# Patient Record
Sex: MALE | URBAN - METROPOLITAN AREA
[De-identification: names, ages, dates, MRNs, and addresses within clinical notes are randomized per-mention and may not be internally consistent; named-entity substitution may affect disease eponyms.]

---

## 2021-10-21 ENCOUNTER — TRANSFERRED RECORDS (OUTPATIENT)
Dept: HEALTH INFORMATION MANAGEMENT | Facility: CLINIC | Age: 41
End: 2021-10-21

## 2021-10-21 LAB
CREATININE (EXTERNAL): 0.9 MG/DL (ref 0.72–1.25)
GFR ESTIMATED (EXTERNAL): >60 ML/MIN/1.73M2
GFR ESTIMATED (IF AFRICAN AMERICAN) (EXTERNAL): >60 ML/MIN/1.73M2
PHQ9 SCORE: 6

## 2021-12-07 ENCOUNTER — TRANSCRIBE ORDERS (OUTPATIENT)
Dept: OTHER | Age: 41
End: 2021-12-07

## 2021-12-07 DIAGNOSIS — R78.71 ABNORMAL LEAD LEVEL IN BLOOD: Primary | ICD-10-CM

## 2021-12-08 ENCOUNTER — PATIENT OUTREACH (OUTPATIENT)
Dept: ONCOLOGY | Facility: CLINIC | Age: 41
End: 2021-12-08
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
## 2022-08-02 ENCOUNTER — PATIENT OUTREACH (OUTPATIENT)
Dept: ONCOLOGY | Facility: CLINIC | Age: 42
End: 2022-08-02

## 2022-08-02 DIAGNOSIS — G83.4: Primary | ICD-10-CM
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
## 2022-08-03 ENCOUNTER — TELEPHONE (OUTPATIENT)
Dept: NEUROLOGY | Facility: CLINIC | Age: 42
End: 2022-08-03

## 2022-08-03 NOTE — TELEPHONE ENCOUNTER
[facility] Call Center    Phone Message    May a detailed message be left on voicemail: yes     Reason for Call: Appointment Intake    Referring Provider Name: Oncology  Diagnosis and/or Symptoms: Cauda equina syndrome not affecting bladder     Patient has a referral, diagnosis is not in our protocols, please review and contact patient to schedule as appropriate    Action Taken: Other: [facility] neuro    Travel Screening: Not Applicable

## 2022-08-10 NOTE — TELEPHONE ENCOUNTER
Hello,     Please review. Per protocol diagnosis Cauda equina syndrome is seen in neurosurgery.     Thanks    Yari QUIGLEY

## 2022-08-10 NOTE — TELEPHONE ENCOUNTER
[facility] Call Center    Phone Message    May a detailed message be left on voicemail: yes     Reason for Call: Other: Pt calling in to schedule, dx not on protocols. TE sent a week ago for assistance, please call pt back for scheduling as call center unable to scheudle without some direction as dx is not listed on protocols.     Action Taken: Message routed to:  [facility] neurology    Travel Screening: Not Applicable

## 2022-08-11 NOTE — TELEPHONE ENCOUNTER
[facility] Call Center    Phone Message    May a detailed message be left on voicemail: yes     Reason for Call: Other: Patient was calling on the status of setting up an appointment since he has not heard back yet.      Action Taken: Other: Neurosurgery    Travel Screening: Not Applicable

## 2024-11-21 ENCOUNTER — LAB REQUISITION (OUTPATIENT)
Dept: LAB | Facility: CLINIC | Age: 44
End: 2024-11-21

## 2024-11-21 PROCEDURE — 88341 IMHCHEM/IMCYTCHM EA ADD ANTB: CPT | Mod: TC | Performed by: STUDENT IN AN ORGANIZED HEALTH CARE EDUCATION/TRAINING PROGRAM

## 2024-11-26 LAB
PATH REPORT.COMMENTS IMP SPEC: ABNORMAL
PATH REPORT.COMMENTS IMP SPEC: YES
PATH REPORT.FINAL DX SPEC: ABNORMAL
PATH REPORT.GROSS SPEC: ABNORMAL
PATH REPORT.MICROSCOPIC SPEC OTHER STN: ABNORMAL
PATH REPORT.RELEVANT HX SPEC: ABNORMAL
PATH REPORT.RELEVANT HX SPEC: ABNORMAL
PATH REPORT.SITE OF ORIGIN SPEC: ABNORMAL

## 2024-11-27 PROCEDURE — 81287 MGMT GENE PRMTR MTHYLTN ALYS: CPT | Performed by: PATHOLOGY

## 2024-11-27 PROCEDURE — 81455 SO/HL 51/>GSAP DNA/DNA&RNA: CPT | Performed by: PATHOLOGY

## 2024-12-03 ENCOUNTER — PATIENT OUTREACH (OUTPATIENT)
Dept: ONCOLOGY | Facility: CLINIC | Age: 44
End: 2024-12-03
Payer: COMMERCIAL

## 2024-12-03 ENCOUNTER — TRANSCRIBE ORDERS (OUTPATIENT)
Dept: OTHER | Age: 44
End: 2024-12-03

## 2024-12-03 DIAGNOSIS — C71.9 GLIOMA OF BRAIN (H): Primary | ICD-10-CM
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
## 2024-12-05 NOTE — TELEPHONE ENCOUNTER
RECORDS STATUS - ALL OTHER DIAGNOSIS      RECORDS RECEIVED FROM: [facility]   DATE RECEIVED:    NOTES STATUS DETAILS   OFFICE NOTE from referring provider [facility] Dr. Kota Aguilar:    OPERATIVE REPORT [facility] 11/20/24: iotomy   MEDICATION LIST [facility]   LABS     PATHOLOGY REPORTS [facility] Path Consult  11/21/24: CASE FROM [place], [place] ([id number], OBTAINED 11/20/2024)   ANYTHING RELATED TO DIAGNOSIS Epic/ 11/22/24   GENONOMIC TESTING     TYPE: Epic 11/26/24   IMAGING (NEED IMAGES & REPORT)     CT SCANS Requested 12/5 [facility]  11/20/24, 11/19/24, 11/18/24    [facility]  11/18/24

## 2024-12-09 ENCOUNTER — PRE VISIT (OUTPATIENT)
Dept: ONCOLOGY | Facility: CLINIC | Age: 44
End: 2024-12-09
Payer: COMMERCIAL

## 2024-12-09 ENCOUNTER — TUMOR CONFERENCE (OUTPATIENT)
Dept: ONCOLOGY | Facility: CLINIC | Age: 44
End: 2024-12-09
Payer: COMMERCIAL

## 2024-12-09 ENCOUNTER — ONCOLOGY VISIT (OUTPATIENT)
Dept: ONCOLOGY | Facility: CLINIC | Age: 44
End: 2024-12-09
Attending: PSYCHIATRY & NEUROLOGY
Payer: COMMERCIAL

## 2024-12-09 VITALS
HEART RATE: 88 BPM | BODY MASS INDEX: 24.84 KG/M2 | DIASTOLIC BLOOD PRESSURE: 81 MMHG | WEIGHT: 187.4 LBS | SYSTOLIC BLOOD PRESSURE: 134 MMHG | TEMPERATURE: 98.7 F | HEIGHT: 73 IN

## 2024-12-09 DIAGNOSIS — G93.6 BRAIN SWELLING (H): ICD-10-CM
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
## 2024-12-09 DIAGNOSIS — C71.9 HIGH GRADE GLIOMA NOT CLASSIFIABLE BY WHO CRITERIA (H): Primary | ICD-10-CM

## 2024-12-09 DIAGNOSIS — Z18.10 EMBEDDED METAL FRAGMENTS: ICD-10-CM

## 2024-12-09 DIAGNOSIS — Z91.89 HIGH RISK FOR CHEMOTHERAPY-INDUCED INFECTIOUS COMPLICATION: ICD-10-CM

## 2024-12-09 PROCEDURE — 99205 OFFICE O/P NEW HI 60 MIN: CPT | Performed by: PSYCHIATRY & NEUROLOGY

## 2024-12-09 PROCEDURE — G2211 COMPLEX E/M VISIT ADD ON: HCPCS | Performed by: PSYCHIATRY & NEUROLOGY

## 2024-12-09 PROCEDURE — 99417 PROLNG OP E/M EACH 15 MIN: CPT | Performed by: PSYCHIATRY & NEUROLOGY

## 2024-12-09 PROCEDURE — 99213 OFFICE O/P EST LOW 20 MIN: CPT | Performed by: PSYCHIATRY & NEUROLOGY

## 2024-12-09 RX ORDER — DEXAMETHASONE 4 MG/1
2 TABLET ORAL 2 TIMES DAILY
COMMUNITY
Start: 2024-11-21

## 2024-12-09 RX ORDER — CHLORAL HYDRATE 500 MG
1000 CAPSULE ORAL DAILY
COMMUNITY
Start: 2024-02-15

## 2024-12-09 RX ORDER — AMPICILLIN TRIHYDRATE 500 MG
1000 CAPSULE ORAL DAILY
COMMUNITY
Start: 2024-05-10

## 2024-12-09 RX ORDER — DIPHENHYDRAMINE HYDROCHLORIDE 50 MG/ML
50 INJECTION INTRAMUSCULAR; INTRAVENOUS
Start: 2024-12-30

## 2024-12-09 RX ORDER — EPINEPHRINE 1 MG/ML
0.3 INJECTION, SOLUTION INTRAMUSCULAR; SUBCUTANEOUS EVERY 5 MIN PRN
OUTPATIENT
Start: 2024-12-30

## 2024-12-09 RX ORDER — MEPERIDINE HYDROCHLORIDE 25 MG/ML
25 INJECTION INTRAMUSCULAR; INTRAVENOUS; SUBCUTANEOUS
OUTPATIENT
Start: 2024-12-30

## 2024-12-09 RX ORDER — ESCITALOPRAM OXALATE 5 MG/1
5 TABLET ORAL DAILY
COMMUNITY
Start: 2024-11-12

## 2024-12-09 RX ORDER — ALBUTEROL SULFATE 90 UG/1
1-2 INHALANT RESPIRATORY (INHALATION)
Start: 2024-12-30

## 2024-12-09 RX ORDER — IBUPROFEN 800 MG/1
1 TABLET, FILM COATED ORAL 3 TIMES DAILY PRN
COMMUNITY
Start: 2024-05-10

## 2024-12-09 RX ORDER — DIPHENHYDRAMINE HYDROCHLORIDE 50 MG/ML
25 INJECTION INTRAMUSCULAR; INTRAVENOUS
Start: 2024-12-30

## 2024-12-09 RX ORDER — DEXTROAMPHETAMINE SACCHARATE, AMPHETAMINE ASPARTATE, DEXTROAMPHETAMINE SULFATE AND AMPHETAMINE SULFATE 5; 5; 5; 5 MG/1; MG/1; MG/1; MG/1
20 TABLET ORAL PRN
COMMUNITY
Start: 2024-10-31

## 2024-12-09 RX ORDER — PENTAMIDINE ISETHIONATE 300 MG/300MG
300 INHALANT RESPIRATORY (INHALATION) ONCE
Start: 2024-12-30 | End: 2024-12-30

## 2024-12-09 RX ORDER — ALBUTEROL SULFATE 0.83 MG/ML
2.5 SOLUTION RESPIRATORY (INHALATION) ONCE
Start: 2024-12-30 | End: 2024-12-30

## 2024-12-09 RX ORDER — ZOLPIDEM TARTRATE 5 MG/1
1 TABLET ORAL AT BEDTIME
COMMUNITY
Start: 2024-11-29

## 2024-12-09 RX ORDER — PREGABALIN 75 MG/1
75 CAPSULE ORAL 2 TIMES DAILY
COMMUNITY
Start: 2024-11-12

## 2024-12-09 RX ORDER — ALBUTEROL SULFATE 0.83 MG/ML
2.5 SOLUTION RESPIRATORY (INHALATION)
OUTPATIENT
Start: 2024-12-30

## 2024-12-09 RX ORDER — ACETAMINOPHEN 325 MG/1
2 TABLET ORAL EVERY 4 HOURS PRN
COMMUNITY
Start: 2024-11-21

## 2024-12-09 RX ORDER — TRAMADOL HYDROCHLORIDE 50 MG/1
50 TABLET ORAL 3 TIMES DAILY
COMMUNITY
Start: 2024-10-14

## 2024-12-09 RX ORDER — METHYLPREDNISOLONE SODIUM SUCCINATE 40 MG/ML
40 INJECTION INTRAMUSCULAR; INTRAVENOUS
Start: 2024-12-30

## 2024-12-09 ASSESSMENT — PAIN SCALES - GENERAL: PAINLEVEL_OUTOF10: NO PAIN (0)
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
## 2024-12-09 NOTE — NURSING NOTE
"Oncology Rooming Note    December 9, 2024 9:12 AM   Jasson Mohamud is a 44 year old male who presents for:    Chief Complaint   Patient presents with    Oncology Clinic Visit     Glioma of brain     Initial Vitals: /81 (BP Location: Right arm, Patient Position: Sitting, Cuff Size: Adult Regular)   Pulse 88   Temp 98.7  F (37.1  C) (Oral)   Ht 1.846 m (6' 0.68\")   Wt 85 kg (187 lb 6.4 oz)   BMI 24.94 kg/m   Estimated body mass index is 24.94 kg/m  as calculated from the following:    Height as of this encounter: 1.846 m (6' 0.68\").    Weight as of this encounter: 85 kg (187 lb 6.4 oz). Body surface area is 2.09 meters squared.  No Pain (0) Comment: Data Unavailable   No LMP for male patient.  Allergies reviewed: Yes  Medications reviewed: Yes    Medications: Medication refills not needed today.  Pharmacy name entered into [facility]: [facility] [address] [place], [place] - [address] [place] [address]    Frailty Screening:   Is the patient here for a new oncology consult visit in cancer care? 1. Yes. Over the past month, have you experienced difficulty or required a caregiver to assist with:   1. Balance, walking or general mobility (including any falls)? NO  2. Completion of self-care tasks such as bathing, dressing, toileting, grooming/hygiene?  NO  3. Concentration or memory that affects your daily life?  NO       Clinical concerns: Patient states no new concerns to discuss with provider.        Adelfo Mesa, EMT            "

## 2024-12-10 RX ORDER — DEXAMETHASONE 2 MG/1
TABLET ORAL
Qty: 14 TABLET | Refills: 0 | Status: SHIPPED | OUTPATIENT
Start: 2024-12-10

## 2024-12-10 RX ORDER — DEXAMETHASONE 1 MG
TABLET ORAL
Qty: 14 TABLET | Refills: 0 | Status: SHIPPED | OUTPATIENT
Start: 2024-12-10

## 2024-12-11 ENCOUNTER — ANCILLARY PROCEDURE (OUTPATIENT)
Dept: GENERAL RADIOLOGY | Facility: CLINIC | Age: 44
End: 2024-12-11
Attending: PSYCHIATRY & NEUROLOGY
Payer: COMMERCIAL
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
## 2024-12-11 DIAGNOSIS — Z18.10 EMBEDDED METAL FRAGMENTS: ICD-10-CM

## 2024-12-11 PROCEDURE — 72100 X-RAY EXAM L-S SPINE 2/3 VWS: CPT | Mod: TC | Performed by: RADIOLOGY

## 2024-12-16 ENCOUNTER — TUMOR CONFERENCE (OUTPATIENT)
Dept: ONCOLOGY | Facility: CLINIC | Age: 44
End: 2024-12-16
Payer: COMMERCIAL

## 2024-12-17 ENCOUNTER — PATIENT OUTREACH (OUTPATIENT)
Dept: ONCOLOGY | Facility: CLINIC | Age: 44
End: 2024-12-17
Payer: COMMERCIAL

## 2024-12-17 ENCOUNTER — TELEPHONE (OUTPATIENT)
Dept: ONCOLOGY | Facility: CLINIC | Age: 44
End: 2024-12-17
Payer: COMMERCIAL

## 2024-12-17 DIAGNOSIS — C71.9 GLIOBLASTOMA (H): Primary | ICD-10-CM

## 2024-12-17 NOTE — TELEPHONE ENCOUNTER
Called and spoke with Jasson and I discussed the following;  -I reviewed the final pathology results confirming the diagnosis of glioblastoma.    Therefore, the plan is for chemoradiotherapy with concurrent, then adjuvant temozolomide.    -I reviewed the spine x-ray results and my discussion with neuro-radiology. Based on the results, it was determined that Jasson can have a MRI of the brain if it is performed on a 1.5T scanner with close intra-imaging monitoring for new onset pain/ burning sensation in the back.       Plan:   Imaging order placed; needing MR brain perfusion imaging scheduled at [facility] on the 1.5T scanner as soon as possible.     2.   RNCC to reach out to [facility]; provide update about final diagnosis and the scan that is to be performed so it can be used for their radiation planning.      3.    Awaiting pharmacy to complete insurance prior authorization of temozolomide. Finalized treatment plan is for chemoradiotherapy with concurrent temozolomide.     Daysi Shaffer MD  Neuro-oncology  12/17/2024

## 2024-12-17 NOTE — PROGRESS NOTES
"[facility]: Cancer Care                                                                                      Writer called patient to follow up regarding whether he has radiation scheduled.  Patient reports he has a \"mask fitting\" appointment with [facility] in [place] tomorrow.      Patient questions whether he would be able to get a lumbar MRI based on the results of the x-ray.  He would like to further discuss with Dr. Shaffer.    He also mentioned waiting on pathology results to move forward.    Will route to Dr. Shaffer for guidance.    Myrna Farmer, RN, BSN, OCN  Oncology RN Care Coordinator  [facility]  "

## 2024-12-18 ENCOUNTER — PATIENT OUTREACH (OUTPATIENT)
Dept: ONCOLOGY | Facility: CLINIC | Age: 44
End: 2024-12-18
Payer: COMMERCIAL

## 2024-12-18 ENCOUNTER — TELEPHONE (OUTPATIENT)
Dept: ONCOLOGY | Facility: CLINIC | Age: 44
End: 2024-12-18
Payer: COMMERCIAL

## 2024-12-18 ENCOUNTER — TRANSFERRED RECORDS (OUTPATIENT)
Dept: HEALTH INFORMATION MANAGEMENT | Facility: CLINIC | Age: 44
End: 2024-12-18
Payer: COMMERCIAL

## 2024-12-18 DIAGNOSIS — Z79.899 ENCOUNTER FOR LONG-TERM (CURRENT) USE OF MEDICATIONS: ICD-10-CM

## 2024-12-18 DIAGNOSIS — C71.9 HIGH GRADE GLIOMA NOT CLASSIFIABLE BY WHO CRITERIA (H): Primary | ICD-10-CM

## 2024-12-18 RX ORDER — TEMOZOLOMIDE 140 MG/1
140 CAPSULE ORAL AT BEDTIME
Qty: 42 CAPSULE | Refills: 0 | Status: SHIPPED | OUTPATIENT
Start: 2024-12-30 | End: 2025-02-10

## 2024-12-18 RX ORDER — ONDANSETRON 4 MG/1
4 TABLET, FILM COATED ORAL AT BEDTIME
Qty: 30 TABLET | Refills: 3 | Status: SHIPPED | OUTPATIENT
Start: 2024-12-29

## 2024-12-18 RX ORDER — TEMOZOLOMIDE 20 MG/1
20 CAPSULE ORAL AT BEDTIME
Qty: 42 CAPSULE | Refills: 0 | Status: SHIPPED | OUTPATIENT
Start: 2024-12-30 | End: 2025-02-10

## 2024-12-18 NOTE — TELEPHONE ENCOUNTER
"I called Jasson to touch base re: temozolomide order. We are releasing #42 doses of temozolomide to [facility] pharmacy today. Pt has an MRI this Monday and will determine next steps for radiation. I'm having scheduling reach out to schedule baseline labs. Pt is aware he'll need weekly labs. I also reinforced NOT to start temozolomide until the night before radiation starts.  Pt [facility] [place].   Pt was instructed to stop tumeric due to DDI. Pt mentioned he's also taking \"lions concetta mushroom\", no DDI detected per uptodate database.    We'll keep an eye out for baseline labs and for when radiation will start. We'll follow up with Jasson a week after radiation starts and also monitor his weekly labs.    Jasson is very kind and a pleasure to talk with.    Roselyn Damon PharmD  Hematology/Oncology Clinical Pharmacist  [facility]  [phone]    **The oral chemotherapy pharmacists are now working on provider-specific teams. Your pharmacist team can be reached at [facility]-ONC2 or [phone].**    "

## 2024-12-18 NOTE — PROGRESS NOTES
[facility]: Cancer Care                                                                                      Writer updated [facility] in [place] that patient will be getting a brain MRI on Monday for planning purposes.     Myrna Farmer, RN, BSN, OCN  Oncology RN Care Coordinator  [facility]

## 2024-12-19 NOTE — TUMOR CONFERENCE
Tumor Conference Information     Chemoradiation.       Documentation / Disclaimer Cancer Tumor Board Note  Cancer tumor board recommendations do not override what is determined to be reasonable care and treatment, which is dependent on the circumstances of a patient's case; the patient's medical, social, and personal concerns; and the clinical judgment of the oncologist [physician].

## 2024-12-23 ENCOUNTER — LAB (OUTPATIENT)
Dept: LAB | Facility: CLINIC | Age: 44
End: 2024-12-23
Payer: COMMERCIAL

## 2024-12-23 ENCOUNTER — HOSPITAL ENCOUNTER (OUTPATIENT)
Dept: MRI IMAGING | Facility: CLINIC | Age: 44
Discharge: HOME | End: 2024-12-23
Attending: PSYCHIATRY & NEUROLOGY | Admitting: PSYCHIATRY & NEUROLOGY
Payer: COMMERCIAL

## 2024-12-23 DIAGNOSIS — Z79.899 ENCOUNTER FOR LONG-TERM (CURRENT) USE OF MEDICATIONS: ICD-10-CM

## 2024-12-23 DIAGNOSIS — C71.9 GLIOBLASTOMA (H): ICD-10-CM

## 2024-12-23 DIAGNOSIS — C71.9 HIGH GRADE GLIOMA NOT CLASSIFIABLE BY WHO CRITERIA (H): ICD-10-CM

## 2024-12-23 LAB
ALBUMIN SERPL BCG-MCNC: 4.3 G/DL (ref 3.5–5.2)
ALP SERPL-CCNC: 89 U/L (ref 40–150)
ALT SERPL W P-5'-P-CCNC: 35 U/L (ref 0–70)
ANION GAP SERPL CALCULATED.3IONS-SCNC: 9 MMOL/L (ref 7–15)
AST SERPL W P-5'-P-CCNC: 21 U/L (ref 0–45)
BASOPHILS # BLD AUTO: 0 10E3/UL (ref 0–0.2)
BASOPHILS NFR BLD AUTO: 0 %
BILIRUB SERPL-MCNC: 0.4 MG/DL
BUN SERPL-MCNC: 15 MG/DL (ref 6–20)
CALCIUM SERPL-MCNC: 9.5 MG/DL (ref 8.8–10.4)
CHLORIDE SERPL-SCNC: 104 MMOL/L (ref 98–107)
CREAT SERPL-MCNC: 0.71 MG/DL (ref 0.67–1.17)
EGFRCR SERPLBLD CKD-EPI 2021: >90 ML/MIN/1.73M2
EOSINOPHIL # BLD AUTO: 0 10E3/UL (ref 0–0.7)
EOSINOPHIL NFR BLD AUTO: 1 %
ERYTHROCYTE [DISTWIDTH] IN BLOOD BY AUTOMATED COUNT: 12.9 % (ref 10–15)
GLUCOSE SERPL-MCNC: 109 MG/DL (ref 70–99)
HCO3 SERPL-SCNC: 28 MMOL/L (ref 22–29)
HCT VFR BLD AUTO: 40.2 % (ref 40–53)
HGB BLD-MCNC: 13.2 G/DL (ref 13.3–17.7)
IMM GRANULOCYTES # BLD: 0 10E3/UL
IMM GRANULOCYTES NFR BLD: 1 %
LYMPHOCYTES # BLD AUTO: 0.8 10E3/UL (ref 0.8–5.3)
LYMPHOCYTES NFR BLD AUTO: 14 %
MCH RBC QN AUTO: 29.6 PG (ref 26.5–33)
MCHC RBC AUTO-ENTMCNC: 32.8 G/DL (ref 31.5–36.5)
MCV RBC AUTO: 90 FL (ref 78–100)
MONOCYTES # BLD AUTO: 0.4 10E3/UL (ref 0–1.3)
MONOCYTES NFR BLD AUTO: 6 %
NEUTROPHILS # BLD AUTO: 4.9 10E3/UL (ref 1.6–8.3)
NEUTROPHILS NFR BLD AUTO: 79 %
NRBC # BLD AUTO: 0 10E3/UL
NRBC BLD AUTO-RTO: 0 /100
PLATELET # BLD AUTO: 220 10E3/UL (ref 150–450)
POTASSIUM SERPL-SCNC: 4.7 MMOL/L (ref 3.4–5.3)
PROT SERPL-MCNC: 7.1 G/DL (ref 6.4–8.3)
RBC # BLD AUTO: 4.46 10E6/UL (ref 4.4–5.9)
SODIUM SERPL-SCNC: 141 MMOL/L (ref 135–145)
WBC # BLD AUTO: 6.1 10E3/UL (ref 4–11)

## 2024-12-23 PROCEDURE — 70551 MRI BRAIN STEM W/O DYE: CPT | Mod: 26 | Performed by: RADIOLOGY

## 2024-12-23 PROCEDURE — 85025 COMPLETE CBC W/AUTO DIFF WBC: CPT | Performed by: PATHOLOGY

## 2024-12-23 PROCEDURE — 80053 COMPREHEN METABOLIC PANEL: CPT | Performed by: PATHOLOGY

## 2024-12-23 PROCEDURE — 36415 COLL VENOUS BLD VENIPUNCTURE: CPT | Performed by: PATHOLOGY

## 2024-12-23 PROCEDURE — 70551 MRI BRAIN STEM W/O DYE: CPT

## 2024-12-24 NOTE — ORAL ONC MGMT
Oral Chemotherapy Monitoring Program  Lab Follow Up    Reviewed CBC and CMP lab results from 12/23/2024.        12/9/2024    11:00 AM 12/9/2024    11:58 AM 12/18/2024    11:00 AM 12/24/2024    10:00 AM   ORAL CHEMOTHERAPY   Assessment Type Initial Work up New Teach Other Lab Monitoring   Diagnosis Code Brain Cancer - Glioblastoma Brain Cancer - Glioblastoma Brain Cancer - Glioblastoma Brain Cancer - Glioblastoma   Providers Dr. Edmund Shaffer   [facility]   Is this patient followed by the [facility] OC team?   No No   Drug Name Temodar (temozolomide) Temodar (temozolomide) Temodar (temozolomide) Temodar (temozolomide)   Dose 160 mg 160 mg 160 mg 160 mg   Current Schedule Daily Daily Daily Daily   Cycle Details Continuous for 42 days during XRT Continuous for 42 days during XRT Continuous for 42 days during XRT Continuous for 42 days during XRT   Any new drug interactions? Yes Yes  Yes   Pharmacist Intervention? Yes Yes  Yes   Intervention(s) Patient Education;Drug changed (non-chemo) Patient Education  Patient Education   Is the dose as ordered appropriate for the patient? Yes Yes  Yes       Labs:  _  Result Component Current Result Ref Range   Sodium 141 (12/23/2024) 135 - 145 mmol/L     _  Result Component Current Result Ref Range   Potassium 4.7 (12/23/2024) 3.4 - 5.3 mmol/L     _  Result Component Current Result Ref Range   Calcium 9.5 (12/23/2024) 8.8 - 10.4 mg/dL     No results found for Mag within last 30 days.     No results found for Phos within last 30 days.     _  Result Component Current Result Ref Range   Albumin 4.3 (12/23/2024) 3.5 - 5.2 g/dL     _  Result Component Current Result Ref Range   Urea Nitrogen 15.0 (12/23/2024) 6.0 - 20.0 mg/dL     _  Result Component Current Result Ref Range   Creatinine 0.71 (12/23/2024) 0.67 - 1.17 mg/dL     _  Result Component Current Result Ref Range   AST 21 (12/23/2024) 0 - 45 U/L     _  Result Component Current  Result Ref Range   ALT 35 (12/23/2024) 0 - 70 U/L     _  Result Component Current Result Ref Range   Bilirubin Total 0.4 (12/23/2024) <=1.2 mg/dL     _  Result Component Current Result Ref Range   WBC Count 6.1 (12/23/2024) 4.0 - 11.0 10e3/uL     _  Result Component Current Result Ref Range   Hemoglobin 13.2 (L) (12/23/2024) 13.3 - 17.7 g/dL     _  Result Component Current Result Ref Range   Platelet Count 220 (12/23/2024) 150 - 450 10e3/uL   _  Result Component Current Result Ref Range   Absolute Neutrophils 4.9 (12/23/2024) 1.6 - 8.3 10e3/uL        Assessment & Plan:  Reviewed baseline labs. No new concerning lab abnormalities. No changes with temozolomide needed at this time.    Follow-Up:  1. Review start date of TMZ and XRT with patient when determined  2. Review dose of temozolomide 160 mg (140mg + 20mg), confirmed after teach    3. Review DDI with tumeric, confirmed after teach, temozolomide + tumeric: per [employer] interaction- Moderate Be cautious with this combination. Turmeric has antioxidant effects. Theoretically, this may reduce the activity of chemotherapy drugs that generate free radicals. However, research is conflicting.  4. Review duration of radiation, pending start date patient may need additional temozolomide due to holiday     Alfred Calderon, PharmD  Hematology/Oncology Clinical Pharmacist   [facility]   [phone]

## 2024-12-26 DIAGNOSIS — C71.9 HIGH GRADE GLIOMA NOT CLASSIFIABLE BY WHO CRITERIA (H): Primary | ICD-10-CM

## 2024-12-27 ENCOUNTER — HOSPITAL ENCOUNTER (OUTPATIENT)
Dept: CT IMAGING | Facility: CLINIC | Age: 44
Discharge: HOME | End: 2024-12-27
Attending: RADIOLOGY | Admitting: RADIOLOGY
Payer: COMMERCIAL

## 2024-12-27 DIAGNOSIS — C71.9 HIGH GRADE GLIOMA NOT CLASSIFIABLE BY WHO CRITERIA (H): ICD-10-CM

## 2024-12-27 PROCEDURE — 250N000009 HC RX 250: Performed by: RADIOLOGY

## 2024-12-27 PROCEDURE — 250N000011 HC RX IP 250 OP 636: Performed by: RADIOLOGY

## 2024-12-27 PROCEDURE — 70470 CT HEAD/BRAIN W/O & W/DYE: CPT

## 2024-12-27 RX ORDER — IOPAMIDOL 755 MG/ML
500 INJECTION, SOLUTION INTRAVASCULAR ONCE
Status: COMPLETED | OUTPATIENT
Start: 2024-12-27 | End: 2024-12-27

## 2024-12-27 RX ADMIN — SODIUM CHLORIDE 60 ML: 9 INJECTION, SOLUTION INTRAVENOUS at 14:33

## 2024-12-27 RX ADMIN — IOPAMIDOL 67 ML: 755 INJECTION, SOLUTION INTRAVENOUS at 14:33

## 2024-12-28 ENCOUNTER — HEALTH MAINTENANCE LETTER (OUTPATIENT)
Age: 44
End: 2024-12-28

## 2024-12-30 ENCOUNTER — TELEPHONE (OUTPATIENT)
Dept: ONCOLOGY | Facility: CLINIC | Age: 44
End: 2024-12-30

## 2024-12-30 ENCOUNTER — LAB (OUTPATIENT)
Dept: LAB | Facility: CLINIC | Age: 44
End: 2024-12-30
Attending: PSYCHIATRY & NEUROLOGY
Payer: COMMERCIAL

## 2024-12-30 ENCOUNTER — TRANSFERRED RECORDS (OUTPATIENT)
Dept: HEALTH INFORMATION MANAGEMENT | Facility: CLINIC | Age: 44
End: 2024-12-30

## 2024-12-30 DIAGNOSIS — C71.9 HIGH GRADE GLIOMA NOT CLASSIFIABLE BY WHO CRITERIA (H): Primary | ICD-10-CM

## 2024-12-30 DIAGNOSIS — C71.9 HIGH GRADE GLIOMA NOT CLASSIFIABLE BY WHO CRITERIA (H): ICD-10-CM

## 2024-12-30 LAB
ALBUMIN SERPL BCG-MCNC: 4.3 G/DL (ref 3.5–5.2)
ALP SERPL-CCNC: 85 U/L (ref 40–150)
ALT SERPL W P-5'-P-CCNC: 47 U/L (ref 0–70)
ANION GAP SERPL CALCULATED.3IONS-SCNC: 9 MMOL/L (ref 7–15)
AST SERPL W P-5'-P-CCNC: 28 U/L (ref 0–45)
BASOPHILS # BLD AUTO: 0 10E3/UL (ref 0–0.2)
BASOPHILS NFR BLD AUTO: 0 %
BILIRUB SERPL-MCNC: 0.4 MG/DL
BUN SERPL-MCNC: 13.6 MG/DL (ref 6–20)
CALCIUM SERPL-MCNC: 10 MG/DL (ref 8.8–10.4)
CHLORIDE SERPL-SCNC: 104 MMOL/L (ref 98–107)
CREAT SERPL-MCNC: 0.76 MG/DL (ref 0.67–1.17)
EGFRCR SERPLBLD CKD-EPI 2021: >90 ML/MIN/1.73M2
EOSINOPHIL # BLD AUTO: 0 10E3/UL (ref 0–0.7)
EOSINOPHIL NFR BLD AUTO: 0 %
ERYTHROCYTE [DISTWIDTH] IN BLOOD BY AUTOMATED COUNT: 12.7 % (ref 10–15)
GLUCOSE SERPL-MCNC: 105 MG/DL (ref 70–99)
HBV CORE AB SERPL QL IA: NONREACTIVE
HBV SURFACE AG SERPL QL IA: NONREACTIVE
HCO3 SERPL-SCNC: 27 MMOL/L (ref 22–29)
HCT VFR BLD AUTO: 39.4 % (ref 40–53)
HGB BLD-MCNC: 13.3 G/DL (ref 13.3–17.7)
IMM GRANULOCYTES # BLD: 0 10E3/UL
IMM GRANULOCYTES NFR BLD: 0 %
LYMPHOCYTES # BLD AUTO: 0.8 10E3/UL (ref 0.8–5.3)
LYMPHOCYTES NFR BLD AUTO: 10 %
MCH RBC QN AUTO: 29.8 PG (ref 26.5–33)
MCHC RBC AUTO-ENTMCNC: 33.8 G/DL (ref 31.5–36.5)
MCV RBC AUTO: 88 FL (ref 78–100)
MONOCYTES # BLD AUTO: 0.4 10E3/UL (ref 0–1.3)
MONOCYTES NFR BLD AUTO: 6 %
NEUTROPHILS # BLD AUTO: 6.3 10E3/UL (ref 1.6–8.3)
NEUTROPHILS NFR BLD AUTO: 83 %
NRBC # BLD AUTO: 0 10E3/UL
NRBC BLD AUTO-RTO: 0 /100
PLATELET # BLD AUTO: 294 10E3/UL (ref 150–450)
POTASSIUM SERPL-SCNC: 4.8 MMOL/L (ref 3.4–5.3)
PROT SERPL-MCNC: 7.2 G/DL (ref 6.4–8.3)
RBC # BLD AUTO: 4.47 10E6/UL (ref 4.4–5.9)
SODIUM SERPL-SCNC: 140 MMOL/L (ref 135–145)
WBC # BLD AUTO: 7.6 10E3/UL (ref 4–11)

## 2024-12-30 PROCEDURE — 86704 HEP B CORE ANTIBODY TOTAL: CPT

## 2024-12-30 PROCEDURE — 87340 HEPATITIS B SURFACE AG IA: CPT

## 2024-12-30 PROCEDURE — 82435 ASSAY OF BLOOD CHLORIDE: CPT

## 2024-12-30 PROCEDURE — 85025 COMPLETE CBC W/AUTO DIFF WBC: CPT

## 2024-12-30 PROCEDURE — 94642 AEROSOL INHALATION TREATMENT: CPT | Performed by: INTERNAL MEDICINE

## 2024-12-30 PROCEDURE — 94640 AIRWAY INHALATION TREATMENT: CPT | Performed by: INTERNAL MEDICINE

## 2024-12-30 PROCEDURE — 36415 COLL VENOUS BLD VENIPUNCTURE: CPT

## 2024-12-30 RX ORDER — EPINEPHRINE 1 MG/ML
0.3 INJECTION, SOLUTION, CONCENTRATE INTRAVENOUS EVERY 5 MIN PRN
OUTPATIENT
Start: 2025-01-27

## 2024-12-30 RX ORDER — METHYLPREDNISOLONE SODIUM SUCCINATE 40 MG/ML
40 INJECTION INTRAMUSCULAR; INTRAVENOUS
Start: 2025-01-27

## 2024-12-30 RX ORDER — ALBUTEROL SULFATE 0.83 MG/ML
2.5 SOLUTION RESPIRATORY (INHALATION) ONCE
Start: 2025-01-27 | End: 2025-01-27

## 2024-12-30 RX ORDER — ALBUTEROL SULFATE 0.83 MG/ML
2.5 SOLUTION RESPIRATORY (INHALATION) ONCE
Status: COMPLETED | OUTPATIENT
Start: 2024-12-30 | End: 2024-12-30

## 2024-12-30 RX ORDER — DIPHENHYDRAMINE HYDROCHLORIDE 50 MG/ML
25 INJECTION INTRAMUSCULAR; INTRAVENOUS
Start: 2025-01-27

## 2024-12-30 RX ORDER — DIPHENHYDRAMINE HYDROCHLORIDE 50 MG/ML
50 INJECTION INTRAMUSCULAR; INTRAVENOUS
Start: 2025-01-27

## 2024-12-30 RX ORDER — PENTAMIDINE ISETHIONATE 300 MG/300MG
300 INHALANT RESPIRATORY (INHALATION) ONCE
Start: 2025-01-27 | End: 2025-01-27

## 2024-12-30 RX ORDER — MEPERIDINE HYDROCHLORIDE 25 MG/ML
25 INJECTION INTRAMUSCULAR; INTRAVENOUS; SUBCUTANEOUS
OUTPATIENT
Start: 2025-01-27

## 2024-12-30 RX ORDER — ALBUTEROL SULFATE 0.83 MG/ML
2.5 SOLUTION RESPIRATORY (INHALATION)
OUTPATIENT
Start: 2025-01-27

## 2024-12-30 RX ORDER — ALBUTEROL SULFATE 90 UG/1
1-2 INHALANT RESPIRATORY (INHALATION)
Start: 2025-01-27

## 2024-12-30 RX ORDER — PENTAMIDINE ISETHIONATE 300 MG/300MG
300 INHALANT RESPIRATORY (INHALATION) ONCE
Status: COMPLETED | OUTPATIENT
Start: 2024-12-30 | End: 2024-12-30

## 2024-12-30 RX ADMIN — ALBUTEROL SULFATE 2.5 MG: 0.83 SOLUTION RESPIRATORY (INHALATION) at 13:48

## 2024-12-30 RX ADMIN — PENTAMIDINE ISETHIONATE 300 MG: 300 INHALANT RESPIRATORY (INHALATION) at 13:58

## 2024-12-30 NOTE — ORAL ONC MGMT
Oral Chemotherapy Monitoring Program     Jasson Mohamud called in follow up of temozolomide oral chemotherapy.     I called Jasson today to confirm radiation start. Jasson reported he had his first radiation today and he did start Temodar last night. We reviewed Temodar dosing and administration.     Jasson is aware of weekly labs and I have send an inbasket to assist with scheduling this today. Also as he will not have radiation on New Years day, he will need an extra dose of Temodar. Order was added to treatment plan today and routed to Dr Shaffer.     Pt verbalized understanding and agrees to plan. No further questions or concerns at this time, but encouraged pt to call if any were to arise.    India Baez, PharmD  Oral Chemotherapy Monitoring Program  [facility]  [phone]  December 30, 2024

## 2024-12-30 NOTE — NURSING NOTE
Chief Complaint   Patient presents with    Labs Only     Labs drawn via  by RN.      Mary Renteria, RN

## 2024-12-30 NOTE — PROGRESS NOTES
Jasson Mohamud was seen today for a Pentamidine nebulizer tx ordered by Dr. Daysi Shaffer.    Patient was first given 2.5 mg of Albuterol nebulizer (LOT# [id number], EXP 5/31/2026), after which Pentamidine 300 mg (Lot # [id number], EXP 08/2026) mixed with 6cc Sterile H20 was administered through a filtered nebulizer.    Pre-treatment: SpO2 = 95%   HR = 70   BBS = clear   Post-treatment: SpO2 = 99%  HR = 76  BBS = clear    No adverse side effects noted by the patient.    This service today was provided under Dr. Becerra, who was available if needed.     Procedure was completed by Sidra Dixon.

## 2025-01-02 ENCOUNTER — TELEPHONE (OUTPATIENT)
Dept: ONCOLOGY | Facility: CLINIC | Age: 45
End: 2025-01-02
Payer: COMMERCIAL

## 2025-01-02 NOTE — ORAL ONC MGMT
Oral Chemotherapy Monitoring Program    Subjective/Objective:  Jasson Mohamud is a 44 year old male contacted by phone for a follow-up of reported rash.  Jasson shares that the rash started yesterday.  It is on his chest, arms, back, and scalp.  It is itchy with blotchyy red spots.  He said he did take an epsom salt bath yesterday, but he has taken these before.  I asked him if the epsom salt was any different than normal as some can contain lavender and other additives.  He did not pay attention to see if it was a different type than what his wife usually buys.  He did take Zytec 10mg this morning and this is helping a little. He is not uncomfortable at this time.  He states he did not sleep well last night as he was itching all night, especially his scalp.  He is also having worsening nerve pain that is also keeping him up.  He is on Ambien for sleep, which he takes as needed.  He is unsure if this is really working, as he is up about 4 hours after he takes it.  I recommended he take Benadryl tonight and skip the Ambien to help him sleep through the itching and  famotidine and take 20mg twice daily.  He will do this. Let him know someone will reach out to him today to get scheduled to see an [facility] tomorrow to evaluate the rash.  He should go to the ER if he is feeling shortness of breath or rash gets significantly worse.           12/9/2024    11:00 AM 12/9/2024    11:58 AM 12/18/2024    11:00 AM 12/24/2024    10:00 AM 12/30/2024     3:00 PM 1/2/2025    10:00 AM   ORAL CHEMOTHERAPY   Assessment Type Initial Work up New Teach Other Lab Monitoring;Chart Review Other Other   Diagnosis Code Brain Cancer - Glioblastoma Brain Cancer - Glioblastoma Brain Cancer - Glioblastoma Brain Cancer - Glioblastoma Brain Cancer - Glioblastoma Brain Cancer - Glioblastoma   Providers Dr. Edmund Shaffer   [facility]   Is this patient  "followed by the [facility] OC team?   No No No No   Drug Name Temodar (temozolomide) Temodar (temozolomide) Temodar (temozolomide) Temodar (temozolomide) Temodar (temozolomide) Temodar (temozolomide)   Dose 160 mg 160 mg 160 mg 160 mg 160 mg 160 mg   Current Schedule Daily Daily Daily Daily Daily Daily   Cycle Details Continuous for 42 days during XRT Continuous for 42 days during XRT Continuous for 42 days during XRT Continuous for 42 days during XRT Continuous for 42 days during XRT Continuous for 42 days during XRT   Start Date of Last Cycle     12/29/2024 1/29/2025   Adverse Effects      Rash   Rash      Grade 2   Pharmacist Intervention(Rash)      Yes   Intervention(s)      OTC recommendation   Any new drug interactions? Yes Yes  Yes     Pharmacist Intervention? Yes Yes  Yes     Intervention(s) Patient Education;Drug changed (non-chemo) Patient Education  Patient Education     Is the dose as ordered appropriate for the patient? Yes Yes  Yes         Last PHQ-2 Score on record:        No data to display                Vitals:  BP:   BP Readings from Last 1 Encounters:   12/09/24 134/81     Wt Readings from Last 1 Encounters:   12/09/24 85 kg (187 lb 6.4 oz)     Estimated body surface area is 2.09 meters squared as calculated from the following:    Height as of 12/9/24: 1.846 m (6' 0.68\").    Weight as of 12/9/24: 85 kg (187 lb 6.4 oz).    Labs:  _  Result Component Current Result Ref Range   Sodium 140 (12/30/2024) 135 - 145 mmol/L     _  Result Component Current Result Ref Range   Potassium 4.8 (12/30/2024) 3.4 - 5.3 mmol/L     _  Result Component Current Result Ref Range   Calcium 10.0 (12/30/2024) 8.8 - 10.4 mg/dL     No results found for Mag within last 30 days.     No results found for Phos within last 30 days.     _  Result Component Current Result Ref Range   Albumin 4.3 (12/30/2024) 3.5 - 5.2 g/dL     _  Result Component Current Result Ref Range   Urea Nitrogen 13.6 (12/30/2024) 6.0 - 20.0 mg/dL     _  Result " Component Current Result Ref Range   Creatinine 0.76 (12/30/2024) 0.67 - 1.17 mg/dL     _  Result Component Current Result Ref Range   AST 28 (12/30/2024) 0 - 45 U/L     _  Result Component Current Result Ref Range   ALT 47 (12/30/2024) 0 - 70 U/L     _  Result Component Current Result Ref Range   Bilirubin Total 0.4 (12/30/2024) <=1.2 mg/dL     _  Result Component Current Result Ref Range   WBC Count 7.6 (12/30/2024) 4.0 - 11.0 10e3/uL     _  Result Component Current Result Ref Range   Hemoglobin 13.3 (12/30/2024) 13.3 - 17.7 g/dL     _  Result Component Current Result Ref Range   Platelet Count 294 (12/30/2024) 150 - 450 10e3/uL     No results found for ANC within last 30 days.     _  Result Component Current Result Ref Range   Absolute Neutrophils 6.3 (12/30/2024) 1.6 - 8.3 10e3/uL          Assessment/Plan:  Start famotidine 20mg bid along with Zyrtec 10mg.  Take benadryl tonight at bedtime per directions on package.  Take temozolomide tonight.  Wait for call from [facility] to make appointment for tomorrow to have rash evaluated.      Follow-Up:  1/3: [facility] visit to evaluate rash      Caprice Zhou, PharmD  Hematology/Oncology Clinical Pharmacist  [facility]  [phone]

## 2025-01-03 ENCOUNTER — VIRTUAL VISIT (OUTPATIENT)
Dept: ONCOLOGY | Facility: CLINIC | Age: 45
End: 2025-01-03
Attending: NURSE PRACTITIONER
Payer: COMMERCIAL

## 2025-01-03 VITALS — HEIGHT: 73 IN | WEIGHT: 187 LBS | BODY MASS INDEX: 24.78 KG/M2

## 2025-01-03 DIAGNOSIS — G93.6 BRAIN SWELLING (H): ICD-10-CM

## 2025-01-03 PROCEDURE — 98006 SYNCH AUDIO-VIDEO EST MOD 30: CPT | Performed by: NURSE PRACTITIONER

## 2025-01-03 RX ORDER — DEXAMETHASONE 1 MG
TABLET ORAL
Qty: 14 TABLET | Refills: 0 | Status: CANCELLED | OUTPATIENT
Start: 2025-01-03

## 2025-01-03 ASSESSMENT — PAIN SCALES - GENERAL: PAINLEVEL_OUTOF10: MILD PAIN (2)

## 2025-01-03 NOTE — NURSING NOTE
Current patient location: [address] [place] [id number]    Is the patient currently in the state of [place]? YES    Visit mode:VIDEO    If the visit is dropped, the patient can be reconnected by:VIDEO VISIT: Text to cell phone:   Telephone Information:   Mobile [phone]       Will anyone else be joining the visit? NO  (If patient encounters technical issues they should call [phone]    Are changes needed to the allergy or medication list? No    Are refills needed on medications prescribed by this physician? YES dexAMETHasone (DECADRON) 1 MG tablet     Rooming Documentation:  Questionnaire(s) completed    Reason for visit: RECHECK    Sachin Navarro VVF
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
## 2025-01-03 NOTE — PROGRESS NOTES
Virtual Visit Details    Type of service:  Video Visit     Originating Location (pt. Location): Home  Distant Location (provider location):  Off-site  Platform used for Video Visit: [place] Teddy Bear    Time start: 1055  Time stop: 1115

## 2025-01-06 ENCOUNTER — LAB (OUTPATIENT)
Dept: LAB | Facility: CLINIC | Age: 45
End: 2025-01-06
Payer: COMMERCIAL

## 2025-01-06 DIAGNOSIS — C71.9 HIGH GRADE GLIOMA NOT CLASSIFIABLE BY WHO CRITERIA (H): ICD-10-CM

## 2025-01-06 DIAGNOSIS — Z79.899 ENCOUNTER FOR LONG-TERM (CURRENT) USE OF MEDICATIONS: ICD-10-CM

## 2025-01-06 LAB
ALBUMIN SERPL BCG-MCNC: 4.3 G/DL (ref 3.5–5.2)
ALP SERPL-CCNC: 79 U/L (ref 40–150)
ALT SERPL W P-5'-P-CCNC: 48 U/L (ref 0–70)
ANION GAP SERPL CALCULATED.3IONS-SCNC: 11 MMOL/L (ref 7–15)
AST SERPL W P-5'-P-CCNC: 26 U/L (ref 0–45)
BASOPHILS # BLD AUTO: 0 10E3/UL (ref 0–0.2)
BASOPHILS NFR BLD AUTO: 1 %
BILIRUB SERPL-MCNC: 0.2 MG/DL
BUN SERPL-MCNC: 11.8 MG/DL (ref 6–20)
CALCIUM SERPL-MCNC: 9.5 MG/DL (ref 8.8–10.4)
CHLORIDE SERPL-SCNC: 107 MMOL/L (ref 98–107)
CREAT SERPL-MCNC: 0.83 MG/DL (ref 0.67–1.17)
EGFRCR SERPLBLD CKD-EPI 2021: >90 ML/MIN/1.73M2
EOSINOPHIL # BLD AUTO: 0.1 10E3/UL (ref 0–0.7)
EOSINOPHIL NFR BLD AUTO: 2 %
ERYTHROCYTE [DISTWIDTH] IN BLOOD BY AUTOMATED COUNT: 12.8 % (ref 10–15)
GLUCOSE SERPL-MCNC: 99 MG/DL (ref 70–99)
HCO3 SERPL-SCNC: 26 MMOL/L (ref 22–29)
HCT VFR BLD AUTO: 38.9 % (ref 40–53)
HGB BLD-MCNC: 12.6 G/DL (ref 13.3–17.7)
IMM GRANULOCYTES # BLD: 0 10E3/UL
IMM GRANULOCYTES NFR BLD: 0 %
LYMPHOCYTES # BLD AUTO: 0.9 10E3/UL (ref 0.8–5.3)
LYMPHOCYTES NFR BLD AUTO: 18 %
MCH RBC QN AUTO: 29.4 PG (ref 26.5–33)
MCHC RBC AUTO-ENTMCNC: 32.4 G/DL (ref 31.5–36.5)
MCV RBC AUTO: 91 FL (ref 78–100)
MONOCYTES # BLD AUTO: 0.5 10E3/UL (ref 0–1.3)
MONOCYTES NFR BLD AUTO: 10 %
NEUTROPHILS # BLD AUTO: 3.7 10E3/UL (ref 1.6–8.3)
NEUTROPHILS NFR BLD AUTO: 70 %
NRBC # BLD AUTO: 0 10E3/UL
NRBC BLD AUTO-RTO: 0 /100
PLATELET # BLD AUTO: 250 10E3/UL (ref 150–450)
POTASSIUM SERPL-SCNC: 3.7 MMOL/L (ref 3.4–5.3)
PROT SERPL-MCNC: 6.7 G/DL (ref 6.4–8.3)
RBC # BLD AUTO: 4.28 10E6/UL (ref 4.4–5.9)
SODIUM SERPL-SCNC: 144 MMOL/L (ref 135–145)
WBC # BLD AUTO: 5.3 10E3/UL (ref 4–11)

## 2025-01-06 PROCEDURE — 85014 HEMATOCRIT: CPT

## 2025-01-06 PROCEDURE — 85004 AUTOMATED DIFF WBC COUNT: CPT

## 2025-01-06 PROCEDURE — 84155 ASSAY OF PROTEIN SERUM: CPT

## 2025-01-06 PROCEDURE — 82040 ASSAY OF SERUM ALBUMIN: CPT

## 2025-01-06 PROCEDURE — 36415 COLL VENOUS BLD VENIPUNCTURE: CPT

## 2025-01-06 PROCEDURE — 82435 ASSAY OF BLOOD CHLORIDE: CPT

## 2025-01-07 ENCOUNTER — TELEPHONE (OUTPATIENT)
Dept: ONCOLOGY | Facility: CLINIC | Age: 45
End: 2025-01-07
Payer: COMMERCIAL

## 2025-01-07 DIAGNOSIS — C71.9 HIGH GRADE GLIOMA NOT CLASSIFIABLE BY WHO CRITERIA (H): Primary | ICD-10-CM

## 2025-01-07 NOTE — ORAL ONC MGMT
Oral Chemotherapy Monitoring Program  Lab Follow Up    Reviewed CBC lab results from 1/6/25.        12/9/2024    11:00 AM 12/9/2024    11:58 AM 12/18/2024    11:00 AM 12/24/2024    10:00 AM 12/30/2024     3:00 PM 1/2/2025    10:00 AM 1/7/2025     2:00 PM   ORAL CHEMOTHERAPY   Assessment Type Initial Work up New Teach Other Lab Monitoring;Chart Review Other Other Initial Follow up;Lab Monitoring   Diagnosis Code Brain Cancer - Glioblastoma Brain Cancer - Glioblastoma Brain Cancer - Glioblastoma Brain Cancer - Glioblastoma Brain Cancer - Glioblastoma Brain Cancer - Glioblastoma Brain Cancer - Glioblastoma   Providers Dr. Edmund Shaffer   [facility]   Is this patient followed by the [facility] OC team?   No No No No    Drug Name Temodar (temozolomide) Temodar (temozolomide) Temodar (temozolomide) Temodar (temozolomide) Temodar (temozolomide) Temodar (temozolomide) Temodar (temozolomide)   Dose 160 mg 160 mg 160 mg 160 mg 160 mg 160 mg 160 mg   Current Schedule Daily Daily Daily Daily Daily Daily Daily   Cycle Details Continuous for 42 days during XRT Continuous for 42 days during XRT Continuous for 42 days during XRT Continuous for 42 days during XRT Continuous for 42 days during XRT Continuous for 42 days during XRT Continuous for 42 days during XRT   Start Date of Last Cycle     12/29/2024 1/29/2025 12/29/2024   Adverse Effects      Rash Rash   Rash      Grade 2 Grade 1   Pharmacist Intervention(Rash)      Yes Yes   Intervention(s)      OTC recommendation Patient education   Any new drug interactions? Yes Yes  Yes   No   Pharmacist Intervention? Yes Yes  Yes      Intervention(s) Patient Education;Drug changed (non-chemo) Patient Education  Patient Education      Is the dose as ordered appropriate for the patient? Yes Yes  Yes   Yes   Since the last time we talked, have you been hospitalized or used the emergency  room?       No       Labs:  _  Result Component Current Result Ref Range   Sodium 144 (1/6/2025) 135 - 145 mmol/L     _  Result Component Current Result Ref Range   Potassium 3.7 (1/6/2025) 3.4 - 5.3 mmol/L     _  Result Component Current Result Ref Range   Calcium 9.5 (1/6/2025) 8.8 - 10.4 mg/dL     _  Result Component Current Result Ref Range   Albumin 4.3 (1/6/2025) 3.5 - 5.2 g/dL     _  Result Component Current Result Ref Range   Urea Nitrogen 11.8 (1/6/2025) 6.0 - 20.0 mg/dL     _  Result Component Current Result Ref Range   Creatinine 0.83 (1/6/2025) 0.67 - 1.17 mg/dL     _  Result Component Current Result Ref Range   AST 26 (1/6/2025) 0 - 45 U/L     _  Result Component Current Result Ref Range   ALT 48 (1/6/2025) 0 - 70 U/L     _  Result Component Current Result Ref Range   Bilirubin Total 0.2 (1/6/2025) <=1.2 mg/dL     _  Result Component Current Result Ref Range   WBC Count 5.3 (1/6/2025) 4.0 - 11.0 10e3/uL     _  Result Component Current Result Ref Range   Hemoglobin 12.6 (L) (1/6/2025) 13.3 - 17.7 g/dL     _  Result Component Current Result Ref Range   Platelet Count 250 (1/6/2025) 150 - 450 10e3/uL     Result Component Current Result Ref Range   Absolute Neutrophils 3.7 (1/6/2025) 1.6 - 8.3 10e3/uL        Assessment & Plan:  No new concerning lab abnormalities. No changes with temozolomide needed at this time.    Placed call to patient in follow up of labs from 1/6. Spoke to Jasson. Reviewed no new concerning lab abnormalities. No questions from labs and will continue weekly CBC. Jasson confirms taking the appropriate dose of temozolomide 160mg (140mg + 20mg)  once daily at bedtime and started 12/29. Plans for temozolomide 12/29/24 to 2/9/25 for 43 days (extra day due to 12/31 holiday) and radiation 12/30 to 2/10/25. Discussed refill for 1 day supply of temozolomide will be released to [facility]. He has not skipped or missed any doses of temozolomide.     Denies new or worsening side  effects, missed doses, and recent hospital or ED visits. Jasson reports tramadol and gabapentin dose were increased by primary care provider, patient has been dealing with pain management since 1997. Denies any other medication changes or starting new medications.     Jasson reports the rash has continued to resolve over the weekend. He said rash occurred the morning after taking a hot bath with epsom salt, woke up with bright red and itchy rash. By the second day it started to resolve and over the weekend it was light pink. He stopped taking Zertec over the weekend. Jasson continues to take Benadryl at night. He will call clinic if rash recurs.     Follow-Up:  1/13: visit and qweek CBC labs     Alfred Calderon, PharmD  Hematology/Oncology Clinical Pharmacist   [facility]   [phone]

## 2025-01-08 ENCOUNTER — TELEPHONE (OUTPATIENT)
Dept: ONCOLOGY | Facility: CLINIC | Age: 45
End: 2025-01-08
Payer: COMMERCIAL

## 2025-01-08 RX ORDER — TEMOZOLOMIDE 140 MG/1
140 CAPSULE ORAL AT BEDTIME
Qty: 1 CAPSULE | Refills: 0 | Status: SHIPPED | OUTPATIENT
Start: 2025-01-08 | End: 2025-01-09

## 2025-01-08 RX ORDER — TEMOZOLOMIDE 20 MG/1
20 CAPSULE ORAL AT BEDTIME
Qty: 1 CAPSULE | Refills: 0 | Status: SHIPPED | OUTPATIENT
Start: 2025-01-08 | End: 2025-01-09

## 2025-01-08 NOTE — TELEPHONE ENCOUNTER
PA Initiation    Medication: TEMOZOLOMIDE 140 MG PO CAPS  Insurance Company: [employer] - Phone [phone] Fax [phone]  Pharmacy Filling the Rx:    Filling Pharmacy Phone:    Filling Pharmacy Fax:    Start Date: 1/8/2025        Mary Gonzalez CPhTOncology Pharmacy [facility] [address] [address]  Office: [phone]  Fax: [phone]  [email or web address]

## 2025-01-08 NOTE — TELEPHONE ENCOUNTER
PA Initiation    Medication: TEMOZOLOMIDE 20 MG PO CAPS  Insurance Company: [employer] - Phone [phone] Fax [phone]  Pharmacy Filling the Rx:    Filling Pharmacy Phone:    Filling Pharmacy Fax:    Start Date: 1/8/2025        Mary Gonzalez CPhTOncology Pharmacy [facility] [address] [address]  Office: [phone]  Fax: [phone]  [email or web address]

## 2025-01-09 NOTE — TELEPHONE ENCOUNTER
Prior Authorization Approval    Medication: TEMOZOLOMIDE 20 MG PO CAPS  Authorization Effective Date: 1/8/2025  Authorization Expiration Date: 1/8/2026  Approved Dose/Quantity:   Reference #:     Insurance Company: [employer] - Phone [phone] Fax [phone]  Expected CoPay: $    CoPay Card Available: No    Financial Assistance Needed:   Which Pharmacy is filling the prescription: [facility] - [place], [place] - [address] [place] [address]  Pharmacy Notified:   Patient Notified:         Mary Gonzalez CPhTOncology Pharmacy Liaison     [facility]  Cl[facility]  [address] [place] [address]  Office: [phone]  Fax: [phone]  [email or web address]

## 2025-01-13 ENCOUNTER — TUMOR CONFERENCE (OUTPATIENT)
Dept: ONCOLOGY | Facility: CLINIC | Age: 45
End: 2025-01-13
Payer: COMMERCIAL

## 2025-01-13 ENCOUNTER — VIRTUAL VISIT (OUTPATIENT)
Dept: ONCOLOGY | Facility: CLINIC | Age: 45
End: 2025-01-13
Attending: NURSE PRACTITIONER
Payer: COMMERCIAL

## 2025-01-13 ENCOUNTER — LAB (OUTPATIENT)
Dept: LAB | Facility: CLINIC | Age: 45
End: 2025-01-13
Payer: COMMERCIAL

## 2025-01-13 VITALS — WEIGHT: 195 LBS | BODY MASS INDEX: 27.3 KG/M2 | HEIGHT: 71 IN

## 2025-01-13 DIAGNOSIS — G62.9 NEUROPATHY: ICD-10-CM

## 2025-01-13 DIAGNOSIS — Z18.10 EMBEDDED METAL FRAGMENTS: ICD-10-CM

## 2025-01-13 DIAGNOSIS — C71.9 HIGH GRADE GLIOMA NOT CLASSIFIABLE BY WHO CRITERIA (H): ICD-10-CM

## 2025-01-13 DIAGNOSIS — C71.9 GLIOBLASTOMA (H): Primary | ICD-10-CM

## 2025-01-13 DIAGNOSIS — Z79.899 ENCOUNTER FOR LONG-TERM (CURRENT) USE OF MEDICATIONS: ICD-10-CM

## 2025-01-13 LAB
ALBUMIN SERPL BCG-MCNC: 4.4 G/DL (ref 3.5–5.2)
ALP SERPL-CCNC: 86 U/L (ref 40–150)
ALT SERPL W P-5'-P-CCNC: 40 U/L (ref 0–70)
ANION GAP SERPL CALCULATED.3IONS-SCNC: 10 MMOL/L (ref 7–15)
AST SERPL W P-5'-P-CCNC: 24 U/L (ref 0–45)
BASOPHILS # BLD AUTO: 0 10E3/UL (ref 0–0.2)
BASOPHILS NFR BLD AUTO: 1 %
BILIRUB SERPL-MCNC: 0.3 MG/DL
BUN SERPL-MCNC: 12.1 MG/DL (ref 6–20)
CALCIUM SERPL-MCNC: 9.7 MG/DL (ref 8.8–10.4)
CHLORIDE SERPL-SCNC: 104 MMOL/L (ref 98–107)
CREAT SERPL-MCNC: 0.85 MG/DL (ref 0.67–1.17)
EGFRCR SERPLBLD CKD-EPI 2021: >90 ML/MIN/1.73M2
EOSINOPHIL # BLD AUTO: 0.1 10E3/UL (ref 0–0.7)
EOSINOPHIL NFR BLD AUTO: 2 %
ERYTHROCYTE [DISTWIDTH] IN BLOOD BY AUTOMATED COUNT: 12.6 % (ref 10–15)
GLUCOSE SERPL-MCNC: 93 MG/DL (ref 70–99)
HCO3 SERPL-SCNC: 26 MMOL/L (ref 22–29)
HCT VFR BLD AUTO: 42.1 % (ref 40–53)
HGB BLD-MCNC: 13.8 G/DL (ref 13.3–17.7)
IMM GRANULOCYTES # BLD: 0 10E3/UL
IMM GRANULOCYTES NFR BLD: 0 %
LYMPHOCYTES # BLD AUTO: 1 10E3/UL (ref 0.8–5.3)
LYMPHOCYTES NFR BLD AUTO: 24 %
MCH RBC QN AUTO: 29.6 PG (ref 26.5–33)
MCHC RBC AUTO-ENTMCNC: 32.8 G/DL (ref 31.5–36.5)
MCV RBC AUTO: 90 FL (ref 78–100)
MONOCYTES # BLD AUTO: 0.5 10E3/UL (ref 0–1.3)
MONOCYTES NFR BLD AUTO: 12 %
NEUTROPHILS # BLD AUTO: 2.4 10E3/UL (ref 1.6–8.3)
NEUTROPHILS NFR BLD AUTO: 60 %
NRBC # BLD AUTO: 0 10E3/UL
NRBC BLD AUTO-RTO: 0 /100
PATH REPORT.ADDENDUM SPEC: ABNORMAL
PATH REPORT.COMMENTS IMP SPEC: ABNORMAL
PATH REPORT.COMMENTS IMP SPEC: YES
PATH REPORT.FINAL DX SPEC: ABNORMAL
PATH REPORT.GROSS SPEC: ABNORMAL
PATH REPORT.MICROSCOPIC SPEC OTHER STN: ABNORMAL
PATH REPORT.RELEVANT HX SPEC: ABNORMAL
PATH REPORT.RELEVANT HX SPEC: ABNORMAL
PATH REPORT.SITE OF ORIGIN SPEC: ABNORMAL
PLATELET # BLD AUTO: 244 10E3/UL (ref 150–450)
POTASSIUM SERPL-SCNC: 4.6 MMOL/L (ref 3.4–5.3)
PROT SERPL-MCNC: 7 G/DL (ref 6.4–8.3)
RBC # BLD AUTO: 4.66 10E6/UL (ref 4.4–5.9)
SODIUM SERPL-SCNC: 140 MMOL/L (ref 135–145)
WBC # BLD AUTO: 4 10E3/UL (ref 4–11)

## 2025-01-13 PROCEDURE — 80053 COMPREHEN METABOLIC PANEL: CPT

## 2025-01-13 PROCEDURE — 98007 SYNCH AUDIO-VIDEO EST HI 40: CPT | Performed by: NURSE PRACTITIONER

## 2025-01-13 PROCEDURE — 85048 AUTOMATED LEUKOCYTE COUNT: CPT

## 2025-01-13 PROCEDURE — 36415 COLL VENOUS BLD VENIPUNCTURE: CPT

## 2025-01-13 PROCEDURE — 85004 AUTOMATED DIFF WBC COUNT: CPT

## 2025-01-13 ASSESSMENT — PAIN SCALES - GENERAL: PAINLEVEL_OUTOF10: MILD PAIN (2)

## 2025-01-13 NOTE — NURSING NOTE
Patient confirms medications and allergies are accurate via patients echeck in completion, and or denies any changes since last reviewed/verified.     Current patient location: [facility] [address] [place] [id number]    Is the patient currently in the state of [place]? YES    Visit mode: VIDEO    If the visit is dropped, the patient can be reconnected by:VIDEO VISIT: Text to cell phone:   Telephone Information:   Mobile [phone]       Will anyone else be joining the visit? NO  (If patient encounters technical issues they should call [phone]    Are changes needed to the allergy or medication list? No    Are refills needed on medications prescribed by this physician? NO    Rooming Documentation:  Unable to complete questionnaire(s) due to time    Reason for visit: RECHECK    Cherelle NELSONF

## 2025-01-13 NOTE — PROGRESS NOTES
Virtual Visit Details    Type of service:  Video Visit     Originating Location (pt. Location): Home  Distant Location (provider location):  On-site  Platform used for Video Visit: [employer]    Time start: 1037  Time stop: 1104
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
## 2025-01-20 ENCOUNTER — APPOINTMENT (OUTPATIENT)
Dept: LAB | Facility: CLINIC | Age: 45
End: 2025-01-20
Payer: COMMERCIAL

## 2025-01-20 DIAGNOSIS — C71.9 HIGH GRADE GLIOMA NOT CLASSIFIABLE BY WHO CRITERIA (H): ICD-10-CM

## 2025-01-20 DIAGNOSIS — Z79.899 ENCOUNTER FOR LONG-TERM (CURRENT) USE OF MEDICATIONS: ICD-10-CM

## 2025-01-20 LAB
ALBUMIN SERPL BCG-MCNC: 4.4 G/DL (ref 3.5–5.2)
ALP SERPL-CCNC: 80 U/L (ref 40–150)
ALT SERPL W P-5'-P-CCNC: 27 U/L (ref 0–70)
ANION GAP SERPL CALCULATED.3IONS-SCNC: 7 MMOL/L (ref 7–15)
AST SERPL W P-5'-P-CCNC: 24 U/L (ref 0–45)
BASOPHILS # BLD AUTO: 0 10E3/UL (ref 0–0.2)
BASOPHILS NFR BLD AUTO: 1 %
BILIRUB SERPL-MCNC: 0.2 MG/DL
BUN SERPL-MCNC: 12.2 MG/DL (ref 6–20)
CALCIUM SERPL-MCNC: 9.5 MG/DL (ref 8.8–10.4)
CHLORIDE SERPL-SCNC: 103 MMOL/L (ref 98–107)
CREAT SERPL-MCNC: 1 MG/DL (ref 0.67–1.17)
EGFRCR SERPLBLD CKD-EPI 2021: >90 ML/MIN/1.73M2
EOSINOPHIL # BLD AUTO: 0.2 10E3/UL (ref 0–0.7)
EOSINOPHIL NFR BLD AUTO: 3 %
ERYTHROCYTE [DISTWIDTH] IN BLOOD BY AUTOMATED COUNT: 12.2 % (ref 10–15)
GLUCOSE SERPL-MCNC: 93 MG/DL (ref 70–99)
HCO3 SERPL-SCNC: 28 MMOL/L (ref 22–29)
HCT VFR BLD AUTO: 39.7 % (ref 40–53)
HGB BLD-MCNC: 13.1 G/DL (ref 13.3–17.7)
IMM GRANULOCYTES # BLD: 0 10E3/UL
IMM GRANULOCYTES NFR BLD: 0 %
LYMPHOCYTES # BLD AUTO: 1 10E3/UL (ref 0.8–5.3)
LYMPHOCYTES NFR BLD AUTO: 20 %
MCH RBC QN AUTO: 29.4 PG (ref 26.5–33)
MCHC RBC AUTO-ENTMCNC: 33 G/DL (ref 31.5–36.5)
MCV RBC AUTO: 89 FL (ref 78–100)
MONOCYTES # BLD AUTO: 0.5 10E3/UL (ref 0–1.3)
MONOCYTES NFR BLD AUTO: 9 %
NEUTROPHILS # BLD AUTO: 3.4 10E3/UL (ref 1.6–8.3)
NEUTROPHILS NFR BLD AUTO: 67 %
NRBC # BLD AUTO: 0 10E3/UL
NRBC BLD AUTO-RTO: 0 /100
PLATELET # BLD AUTO: 236 10E3/UL (ref 150–450)
POTASSIUM SERPL-SCNC: 4.7 MMOL/L (ref 3.4–5.3)
PROT SERPL-MCNC: 6.7 G/DL (ref 6.4–8.3)
RBC # BLD AUTO: 4.46 10E6/UL (ref 4.4–5.9)
SODIUM SERPL-SCNC: 138 MMOL/L (ref 135–145)
WBC # BLD AUTO: 5.1 10E3/UL (ref 4–11)

## 2025-01-20 PROCEDURE — 85004 AUTOMATED DIFF WBC COUNT: CPT | Performed by: PSYCHIATRY & NEUROLOGY

## 2025-01-20 PROCEDURE — 36415 COLL VENOUS BLD VENIPUNCTURE: CPT

## 2025-01-20 PROCEDURE — 82947 ASSAY GLUCOSE BLOOD QUANT: CPT | Performed by: PSYCHIATRY & NEUROLOGY

## 2025-01-21 ENCOUNTER — DOCUMENTATION ONLY (OUTPATIENT)
Dept: ONCOLOGY | Facility: CLINIC | Age: 45
End: 2025-01-21
Payer: COMMERCIAL

## 2025-01-21 NOTE — PROGRESS NOTES
Oral Chemotherapy Monitoring Program  Lab Follow Up    Reviewed lab results from 1/20.    Assessment & Plan:  CBC and CMP reviewed. Results are stable with no concerning abnormalities. Plan to continue Temodar as prescribed. [employer] message sent to patient.    Follow-Up:  1/27 labs    Soo Hanson PharmD  Hematology/Oncology Clinical Pharmacist  [facility]  [phone]        12/18/2024    11:00 AM 12/24/2024    10:00 AM 12/30/2024     3:00 PM 1/2/2025    10:00 AM 1/7/2025     2:00 PM 1/8/2025     9:00 AM 1/21/2025     9:00 AM   ORAL CHEMOTHERAPY   Assessment Type Other Lab Monitoring;Chart Review Other Other Initial Follow up;Lab Monitoring Refill;Other Lab Monitoring   Diagnosis Code Brain Cancer - Glioblastoma Brain Cancer - Glioblastoma Brain Cancer - Glioblastoma Brain Cancer - Glioblastoma Brain Cancer - Glioblastoma Brain Cancer - Glioblastoma Brain Cancer - Glioblastoma   Providers Dr. Edmund Shaffer   [facility]   Is this patient followed by the [facility] OC team? No No No No      Drug Name Temodar (temozolomide) Temodar (temozolomide) Temodar (temozolomide) Temodar (temozolomide) Temodar (temozolomide) Temodar (temozolomide) Temodar (temozolomide)   Dose 160 mg 160 mg 160 mg 160 mg 160 mg 160 mg 160 mg   Current Schedule Daily Daily Daily Daily Daily Daily Daily   Cycle Details Continuous for 42 days during XRT Continuous for 42 days during XRT Continuous for 42 days during XRT Continuous for 42 days during XRT Continuous for 42 days during XRT Continuous for 42 days during XRT Continuous for 42 days during XRT   Start Date of Last Cycle   12/29/2024 1/29/2025 12/29/2024 12/29/2024 12/29/2024   Adverse Effects    Rash Rash     Rash    Grade 2 Grade 1     Pharmacist Intervention(Rash)    Yes Yes     Intervention(s)    OTC recommendation Patient education     Any new drug  interactions?  Yes   No     Pharmacist Intervention?  Yes        Intervention(s)  Patient Education        Is the dose as ordered appropriate for the patient?  Yes   Yes     Since the last time we talked, have you been hospitalized or used the emergency room?     No         Labs:  _  Result Component Current Result Ref Range   Sodium 138 (1/20/2025) 135 - 145 mmol/L     _  Result Component Current Result Ref Range   Potassium 4.7 (1/20/2025) 3.4 - 5.3 mmol/L     _  Result Component Current Result Ref Range   Calcium 9.5 (1/20/2025) 8.8 - 10.4 mg/dL     No results found for Mag within last 30 days.     No results found for Phos within last 30 days.     _  Result Component Current Result Ref Range   Albumin 4.4 (1/20/2025) 3.5 - 5.2 g/dL     _  Result Component Current Result Ref Range   Urea Nitrogen 12.2 (1/20/2025) 6.0 - 20.0 mg/dL     _  Result Component Current Result Ref Range   Creatinine 1.00 (1/20/2025) 0.67 - 1.17 mg/dL     _  Result Component Current Result Ref Range   AST 24 (1/20/2025) 0 - 45 U/L     _  Result Component Current Result Ref Range   ALT 27 (1/20/2025) 0 - 70 U/L     _  Result Component Current Result Ref Range   Bilirubin Total 0.2 (1/20/2025) <=1.2 mg/dL     _  Result Component Current Result Ref Range   WBC Count 5.1 (1/20/2025) 4.0 - 11.0 10e3/uL     _  Result Component Current Result Ref Range   Hemoglobin 13.1 (L) (1/20/2025) 13.3 - 17.7 g/dL     _  Result Component Current Result Ref Range   Platelet Count 236 (1/20/2025) 150 - 450 10e3/uL     _  Result Component Current Result Ref Range   Absolute Neutrophils 3.4 (1/20/2025) 1.6 - 8.3 10e3/uL

## 2025-01-24 ENCOUNTER — TELEPHONE (OUTPATIENT)
Dept: PALLIATIVE MEDICINE | Facility: CLINIC | Age: 45
End: 2025-01-24
Payer: COMMERCIAL

## 2025-01-24 NOTE — TELEPHONE ENCOUNTER
Routing to . Please call and reschedule with provider who does spinal cord stim. Dr Lowe does not powell these     *pt is scheduled 01/28/25, please call BALTA ELLIS, RN Care Coordinator  [facility]  Pain [facility]

## 2025-01-28 ENCOUNTER — OFFICE VISIT (OUTPATIENT)
Dept: PALLIATIVE MEDICINE | Facility: CLINIC | Age: 45
End: 2025-01-28
Attending: NURSE PRACTITIONER
Payer: COMMERCIAL

## 2025-01-28 ENCOUNTER — TELEPHONE (OUTPATIENT)
Dept: ONCOLOGY | Facility: CLINIC | Age: 45
End: 2025-01-28

## 2025-01-28 VITALS — HEART RATE: 66 BPM | OXYGEN SATURATION: 99 % | DIASTOLIC BLOOD PRESSURE: 76 MMHG | SYSTOLIC BLOOD PRESSURE: 131 MMHG

## 2025-01-28 DIAGNOSIS — G62.9 NEUROPATHY: Primary | ICD-10-CM

## 2025-01-28 DIAGNOSIS — Z18.10 EMBEDDED METAL FRAGMENTS: ICD-10-CM

## 2025-01-28 PROCEDURE — 99203 OFFICE O/P NEW LOW 30 MIN: CPT | Performed by: ANESTHESIOLOGY

## 2025-01-28 ASSESSMENT — PAIN SCALES - PAIN ENJOYMENT GENERAL ACTIVITY SCALE (PEG)
INTERFERED_ENJOYMENT_LIFE: 8
AVG_PAIN_PASTWEEK: 7
INTERFERED_GENERAL_ACTIVITY: 4
PEG_TOTALSCORE: 6.33
AVG_PAIN_PASTWEEK: 7
INTERFERED_GENERAL_ACTIVITY: 4
PEG_TOTALSCORE: 6.33
INTERFERED_ENJOYMENT_LIFE: 8

## 2025-01-28 ASSESSMENT — ANXIETY QUESTIONNAIRES
2. NOT BEING ABLE TO STOP OR CONTROL WORRYING: NOT AT ALL
GAD7 TOTAL SCORE: 2
7. FEELING AFRAID AS IF SOMETHING AWFUL MIGHT HAPPEN: NOT AT ALL
1. FEELING NERVOUS, ANXIOUS, OR ON EDGE: NOT AT ALL
6. BECOMING EASILY ANNOYED OR IRRITABLE: NOT AT ALL
5. BEING SO RESTLESS THAT IT IS HARD TO SIT STILL: SEVERAL DAYS
8. IF YOU CHECKED OFF ANY PROBLEMS, HOW DIFFICULT HAVE THESE MADE IT FOR YOU TO DO YOUR WORK, TAKE CARE OF THINGS AT HOME, OR GET ALONG WITH OTHER PEOPLE?: SOMEWHAT DIFFICULT
3. WORRYING TOO MUCH ABOUT DIFFERENT THINGS: NOT AT ALL
7. FEELING AFRAID AS IF SOMETHING AWFUL MIGHT HAPPEN: NOT AT ALL
GAD7 TOTAL SCORE: 2
4. TROUBLE RELAXING: SEVERAL DAYS
GAD7 TOTAL SCORE: 2
IF YOU CHECKED OFF ANY PROBLEMS ON THIS QUESTIONNAIRE, HOW DIFFICULT HAVE THESE PROBLEMS MADE IT FOR YOU TO DO YOUR WORK, TAKE CARE OF THINGS AT HOME, OR GET ALONG WITH OTHER PEOPLE: SOMEWHAT DIFFICULT

## 2025-01-28 ASSESSMENT — PAIN SCALES - GENERAL: PAINLEVEL_OUTOF10: MILD PAIN (2)

## 2025-01-28 NOTE — TELEPHONE ENCOUNTER
Oral Chemotherapy Monitoring Program    Subjective/Objective:  Jasson Mohamud is a 44 year old male contacted by phone for a follow-up visit for oral chemotherapy. He confirmed taking 160 mg temozolomide daily during radiation. He has no missed a dose in the past 2 weeks. He reports that things have been going well for him. His rash has totally cleared up. He mentioned that he had missed his labs this week due to returning back to work. He also said that he has received the remaining supply of temozolomide. He denies any nausea, vomiting, diarrhea, constipation, shortness of breath, fever or chills.         12/24/2024    10:00 AM 12/30/2024     3:00 PM 1/2/2025    10:00 AM 1/7/2025     2:00 PM 1/8/2025     9:00 AM 1/21/2025     9:00 AM 1/28/2025     1:00 PM   ORAL CHEMOTHERAPY   Assessment Type Lab Monitoring;Chart Review Other Other Initial Follow up;Lab Monitoring Refill;Other Lab Monitoring Monthly Follow up   Diagnosis Code Brain Cancer - Glioblastoma Brain Cancer - Glioblastoma Brain Cancer - Glioblastoma Brain Cancer - Glioblastoma Brain Cancer - Glioblastoma Brain Cancer - Glioblastoma Brain Cancer - Glioblastoma   Providers Dr. Edmund Shaffer   [facility]   Is this patient followed by the [facility] OC team? No No No       Drug Name Temodar (temozolomide) Temodar (temozolomide) Temodar (temozolomide) Temodar (temozolomide) Temodar (temozolomide) Temodar (temozolomide) Temodar (temozolomide)   Dose 160 mg 160 mg 160 mg 160 mg 160 mg 160 mg 160 mg   Current Schedule Daily Daily Daily Daily Daily Daily Daily   Cycle Details Continuous for 42 days during XRT Continuous for 42 days during XRT Continuous for 42 days during XRT Continuous for 42 days during XRT Continuous for 42 days during XRT Continuous for 42 days during XRT Continuous for 42 days during XRT   Start Date of Last Cycle  12/29/2024 1/29/2025  "12/29/2024 12/29/2024 12/29/2024 12/29/2024   Doses missed in last 2 weeks       0   Adherence Assessment       Adherent   Adverse Effects   Rash Rash   No AE identified during assessment   Rash   Grade 2 Grade 1      Pharmacist Intervention(Rash)   Yes Yes      Intervention(s)   OTC recommendation Patient education      Any new drug interactions? Yes   No      Pharmacist Intervention? Yes         Intervention(s) Patient Education         Is the dose as ordered appropriate for the patient? Yes   Yes      Since the last time we talked, have you been hospitalized or used the emergency room?    No          Last PHQ-2 Score on record:        No data to display                Vitals:  BP:   BP Readings from Last 1 Encounters:   01/28/25 131/76     Wt Readings from Last 1 Encounters:   01/13/25 88.5 kg (195 lb)     Estimated body surface area is 2.11 meters squared as calculated from the following:    Height as of 1/13/25: 1.803 m (5' 11\").    Weight as of 1/13/25: 88.5 kg (195 lb).    Labs:  _  Result Component Current Result Ref Range   Sodium 138 (1/20/2025) 135 - 145 mmol/L     _  Result Component Current Result Ref Range   Potassium 4.7 (1/20/2025) 3.4 - 5.3 mmol/L     _  Result Component Current Result Ref Range   Calcium 9.5 (1/20/2025) 8.8 - 10.4 mg/dL     No results found for Mag within last 30 days.     No results found for Phos within last 30 days.     _  Result Component Current Result Ref Range   Albumin 4.4 (1/20/2025) 3.5 - 5.2 g/dL     _  Result Component Current Result Ref Range   Urea Nitrogen 12.2 (1/20/2025) 6.0 - 20.0 mg/dL     _  Result Component Current Result Ref Range   Creatinine 1.00 (1/20/2025) 0.67 - 1.17 mg/dL     _  Result Component Current Result Ref Range   AST 24 (1/20/2025) 0 - 45 U/L     _  Result Component Current Result Ref Range   ALT 27 (1/20/2025) 0 - 70 U/L     _  Result Component Current Result Ref Range   Bilirubin Total 0.2 (1/20/2025) <=1.2 mg/dL     _  Result Component " Current Result Ref Range   WBC Count 5.1 (1/20/2025) 4.0 - 11.0 10e3/uL     _  Result Component Current Result Ref Range   Hemoglobin 13.1 (L) (1/20/2025) 13.3 - 17.7 g/dL     _  Result Component Current Result Ref Range   Platelet Count 236 (1/20/2025) 150 - 450 10e3/uL     No results found for ANC within last 30 days.     _  Result Component Current Result Ref Range   Absolute Neutrophils 3.4 (1/20/2025) 1.6 - 8.3 10e3/uL          Assessment/Plan:  There was no new or worsening side effects as a result of temozolomide.     Continue therapy as planned.    Follow-Up:  2/3 labs    Max Mueller  Pharmacy Intern  Oral Chemotherapy Monitoring Program  [facility]  [phone]
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
## 2025-01-28 NOTE — TELEPHONE ENCOUNTER
If this patient arrives today with you,  I was able to have Dr Sung review this one  and I am able to schedule with Dr Sung for a Spine cord Stimulator Eval on 2/4/2025  at 915 am

## 2025-01-28 NOTE — TELEPHONE ENCOUNTER
Chart review: pt arrived and noted he would be going to [facility] for SCS    Aurora ELLIS, RN Care Coordinator  [facility]  Pain [facility]

## 2025-01-30 NOTE — TELEPHONE ENCOUNTER
SPINE PATIENTS - NEW PROTOCOL PREVISIT    RECORDS RECEIVED FROM: Referred by Porsha Saba NP.    REASON FOR VISIT: assess embedded metal fragments in his back    PROVIDER: [place]   DATE OF APPT: 02/04/2025   NOTES (FOR ALL VISITS) STATUS DETAILS   OFFICE NOTE from referring provider N/A Direct referral   OFFICE NOTE from other specialist N/A    DISCHARGE SUMMARY from hospital N/A    DISCHARGE REPORT from ER N/A    OPERATIVE REPORT N/A    EMG REPORT N/A    Injection N/A    Physical therapy N/A    IMAGING  (FOR ALL VISITS)     MRI (HEAD, NECK, SPINE) Internal MRI brain 12-23-24   XRAY (SPINE) *NEUROSURGERY* Internal XR Lumbar 12/11/2024   CT (HEAD, NECK, SPINE) Internal CT head 12/27/2024    CT's from 11/2024 received

## 2025-02-03 ENCOUNTER — LAB (OUTPATIENT)
Dept: LAB | Facility: CLINIC | Age: 45
End: 2025-02-03
Payer: COMMERCIAL

## 2025-02-03 DIAGNOSIS — Z79.899 ENCOUNTER FOR LONG-TERM (CURRENT) USE OF MEDICATIONS: ICD-10-CM

## 2025-02-03 DIAGNOSIS — C71.9 HIGH GRADE GLIOMA NOT CLASSIFIABLE BY WHO CRITERIA (H): ICD-10-CM

## 2025-02-03 LAB
ALBUMIN SERPL BCG-MCNC: 4.3 G/DL (ref 3.5–5.2)
ALP SERPL-CCNC: 77 U/L (ref 40–150)
ALT SERPL W P-5'-P-CCNC: 20 U/L (ref 0–70)
ANION GAP SERPL CALCULATED.3IONS-SCNC: 11 MMOL/L (ref 7–15)
AST SERPL W P-5'-P-CCNC: 22 U/L (ref 0–45)
BASOPHILS # BLD AUTO: 0 10E3/UL (ref 0–0.2)
BASOPHILS NFR BLD AUTO: 0 %
BILIRUB SERPL-MCNC: 0.4 MG/DL
BUN SERPL-MCNC: 19 MG/DL (ref 6–20)
CALCIUM SERPL-MCNC: 9.4 MG/DL (ref 8.8–10.4)
CHLORIDE SERPL-SCNC: 107 MMOL/L (ref 98–107)
CREAT SERPL-MCNC: 0.99 MG/DL (ref 0.67–1.17)
EGFRCR SERPLBLD CKD-EPI 2021: >90 ML/MIN/1.73M2
EOSINOPHIL # BLD AUTO: 0.1 10E3/UL (ref 0–0.7)
EOSINOPHIL NFR BLD AUTO: 2 %
ERYTHROCYTE [DISTWIDTH] IN BLOOD BY AUTOMATED COUNT: 12 % (ref 10–15)
GLUCOSE SERPL-MCNC: 91 MG/DL (ref 70–99)
HCO3 SERPL-SCNC: 27 MMOL/L (ref 22–29)
HCT VFR BLD AUTO: 37.8 % (ref 40–53)
HGB BLD-MCNC: 12.6 G/DL (ref 13.3–17.7)
IMM GRANULOCYTES # BLD: 0 10E3/UL
IMM GRANULOCYTES NFR BLD: 0 %
LYMPHOCYTES # BLD AUTO: 0.6 10E3/UL (ref 0.8–5.3)
LYMPHOCYTES NFR BLD AUTO: 13 %
MCH RBC QN AUTO: 29.3 PG (ref 26.5–33)
MCHC RBC AUTO-ENTMCNC: 33.3 G/DL (ref 31.5–36.5)
MCV RBC AUTO: 88 FL (ref 78–100)
MONOCYTES # BLD AUTO: 0.5 10E3/UL (ref 0–1.3)
MONOCYTES NFR BLD AUTO: 11 %
NEUTROPHILS # BLD AUTO: 3.3 10E3/UL (ref 1.6–8.3)
NEUTROPHILS NFR BLD AUTO: 73 %
NRBC # BLD AUTO: 0 10E3/UL
NRBC BLD AUTO-RTO: 0 /100
PLATELET # BLD AUTO: 186 10E3/UL (ref 150–450)
POTASSIUM SERPL-SCNC: 3.5 MMOL/L (ref 3.4–5.3)
PROT SERPL-MCNC: 6.9 G/DL (ref 6.4–8.3)
RBC # BLD AUTO: 4.3 10E6/UL (ref 4.4–5.9)
SODIUM SERPL-SCNC: 145 MMOL/L (ref 135–145)
WBC # BLD AUTO: 4.6 10E3/UL (ref 4–11)

## 2025-02-03 PROCEDURE — 36415 COLL VENOUS BLD VENIPUNCTURE: CPT

## 2025-02-03 PROCEDURE — 85041 AUTOMATED RBC COUNT: CPT

## 2025-02-03 PROCEDURE — 85004 AUTOMATED DIFF WBC COUNT: CPT

## 2025-02-03 PROCEDURE — 80053 COMPREHEN METABOLIC PANEL: CPT

## 2025-02-04 ENCOUNTER — OFFICE VISIT (OUTPATIENT)
Dept: NEUROSURGERY | Facility: CLINIC | Age: 45
End: 2025-02-04
Attending: NEUROLOGICAL SURGERY
Payer: COMMERCIAL

## 2025-02-04 ENCOUNTER — PRE VISIT (OUTPATIENT)
Dept: NEUROSURGERY | Facility: CLINIC | Age: 45
End: 2025-02-04
Payer: COMMERCIAL

## 2025-02-04 ENCOUNTER — LAB (OUTPATIENT)
Dept: LAB | Facility: CLINIC | Age: 45
End: 2025-02-04
Attending: NEUROLOGICAL SURGERY
Payer: COMMERCIAL

## 2025-02-04 ENCOUNTER — VIRTUAL VISIT (OUTPATIENT)
Dept: ONCOLOGY | Facility: CLINIC | Age: 45
End: 2025-02-04
Attending: NURSE PRACTITIONER
Payer: COMMERCIAL

## 2025-02-04 VITALS — HEART RATE: 72 BPM | DIASTOLIC BLOOD PRESSURE: 87 MMHG | SYSTOLIC BLOOD PRESSURE: 148 MMHG | OXYGEN SATURATION: 100 %

## 2025-02-04 VITALS — BODY MASS INDEX: 26.04 KG/M2 | WEIGHT: 186 LBS | HEIGHT: 71 IN

## 2025-02-04 DIAGNOSIS — C71.9 GLIOBLASTOMA (H): ICD-10-CM

## 2025-02-04 DIAGNOSIS — R19.5 DARK STOOLS: Primary | ICD-10-CM

## 2025-02-04 DIAGNOSIS — Z18.10 EMBEDDED METAL FRAGMENTS: ICD-10-CM

## 2025-02-04 DIAGNOSIS — R19.5 DARK STOOLS: ICD-10-CM

## 2025-02-04 PROCEDURE — 98006 SYNCH AUDIO-VIDEO EST MOD 30: CPT | Performed by: NURSE PRACTITIONER

## 2025-02-04 PROCEDURE — G2211 COMPLEX E/M VISIT ADD ON: HCPCS | Performed by: NURSE PRACTITIONER

## 2025-02-04 PROCEDURE — 99203 OFFICE O/P NEW LOW 30 MIN: CPT | Performed by: NEUROLOGICAL SURGERY

## 2025-02-04 PROCEDURE — 99214 OFFICE O/P EST MOD 30 MIN: CPT | Performed by: NEUROLOGICAL SURGERY

## 2025-02-04 ASSESSMENT — PAIN SCALES - GENERAL: PAINLEVEL_OUTOF10: NO PAIN (0)
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
## 2025-02-04 NOTE — NURSING NOTE
"Jasson Mohamud is a 44 year old male who presents for:  Chief Complaint   Patient presents with    Consult        Vitals:    Vitals:    02/04/25 1340   BP: (!) 148/87   Pulse: 72   SpO2: 100%       BMI:  Estimated body mass index is 25.94 kg/m  as calculated from the following:    Height as of an earlier encounter on 2/4/25: 5' 11\" (1.803 m).    Weight as of an earlier encounter on 2/4/25: 186 lb (84.4 kg).      Amendo Phorn      "
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
## 2025-02-04 NOTE — NURSING NOTE
Is the patient currently in the state of [place]? YES    Visit mode: VIDEO    If the visit is dropped, the patient can be reconnected by:VIDEO VISIT: Send to e-mail at: [email or web address]    Will anyone else be joining the visit? NO  (If patient encounters technical issues they should call [phone])    Are changes needed to the allergy or medication list? No    Are refills needed on medications prescribed by this physician? NO    Rooming Documentation:  Questionnaire(s) completed    Reason for visit: Video Visit (Follow UP )    Janna JAMES
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
## 2025-02-05 ENCOUNTER — TELEPHONE (OUTPATIENT)
Dept: ONCOLOGY | Facility: CLINIC | Age: 45
End: 2025-02-05
Payer: COMMERCIAL

## 2025-02-05 NOTE — ORAL ONC MGMT
Oral Chemotherapy Monitoring Program    Jasson called clinic after missing last nights dose of temozolomide. He took ondansetron but then fell asleep.   Current plan is temozolomide from 12/29/24 to 2/9/25 for 43 days and radiation from 12/30/24 to 2/10/25.    Placed call to Jasson in follow up of plan. Since he missed his dose of temozolomide, reviewed plan with Jasson that he should not take an extra dose or two doses at one time. Reviewed plan to skip the dose of temozolomide scheduled for last night and take the next dose of temozolomide at the regularly scheduled time tonight. He takes his temozolomide about 8:30PM. He will keep the extra dose on hand for future use.     Reviewed interventions to prevent missed doses such as setting an alarm daily as a reminder or taking the dose a little earlier before bed. He already had an alarm set on his wife's phone, but it was turned off for some reason last night. He said that alarm has been helpful for adherence since starting temozolomide 12/29/24. He has not missed any other temozolomide doses. For the remainder of treatment, Jasson is going to set alarms on both his phone and his wife's phone for medication reminder. No other questions or concerns today.    Alfred Calderon, PharmD  Hematology/Oncology Clinical Pharmacist   [facility]   [phone]     **The oral chemotherapy pharmacists are now working on provider-specific teams. Your pharmacist team can be reached at [facility] ORAL CHEMO H-ONC2 or [phone].**

## 2025-02-05 NOTE — TELEPHONE ENCOUNTER
Pt is calling.  He missed his dose of temodar last night. States that he took the zofran as scheduled, and then fell asleep and missed the dose of temodar.   He is scheduled to complete radiation therapy and temodar on 2/10/2025.    Pt is wondering how to handle the missed dose?    Will route to pharmacy team for assistance.  Destiny Busch RN on 2/5/2025 at 8:26 AM

## 2025-02-05 NOTE — TELEPHONE ENCOUNTER
See separate pharmacy telephone encounter from today for further details.  Destiny Busch RN on 2/5/2025 at 2:48 PM

## 2025-02-10 ENCOUNTER — VIRTUAL VISIT (OUTPATIENT)
Dept: ONCOLOGY | Facility: CLINIC | Age: 45
End: 2025-02-10
Attending: NURSE PRACTITIONER
Payer: COMMERCIAL

## 2025-02-10 ENCOUNTER — TUMOR CONFERENCE (OUTPATIENT)
Dept: ONCOLOGY | Facility: CLINIC | Age: 45
End: 2025-02-10
Payer: COMMERCIAL

## 2025-02-10 ENCOUNTER — DOCUMENTATION ONLY (OUTPATIENT)
Dept: ONCOLOGY | Facility: CLINIC | Age: 45
End: 2025-02-10

## 2025-02-10 VITALS — BODY MASS INDEX: 26.6 KG/M2 | HEIGHT: 71 IN | WEIGHT: 190 LBS

## 2025-02-10 DIAGNOSIS — C71.9 GLIOBLASTOMA (H): Primary | ICD-10-CM

## 2025-02-10 DIAGNOSIS — Z79.899 ENCOUNTER FOR LONG-TERM (CURRENT) USE OF MEDICATIONS: ICD-10-CM

## 2025-02-10 DIAGNOSIS — R19.5 DARK STOOLS: ICD-10-CM

## 2025-02-10 PROCEDURE — 98007 SYNCH AUDIO-VIDEO EST HI 40: CPT | Performed by: NURSE PRACTITIONER

## 2025-02-10 RX ORDER — OMEPRAZOLE 20 MG/1
20 CAPSULE, DELAYED RELEASE ORAL DAILY
Qty: 30 CAPSULE | Refills: 3 | Status: SHIPPED | OUTPATIENT
Start: 2025-02-10

## 2025-02-10 ASSESSMENT — PAIN SCALES - GENERAL: PAINLEVEL_OUTOF10: NO PAIN (0)
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
## 2025-02-10 NOTE — NURSING NOTE
Current patient location: Patient declined to provide     Is the patient currently in the state of [place]? YES    Visit mode: VIDEO    If the visit is dropped, the patient can be reconnected by:VIDEO VISIT: Text to cell phone:   Telephone Information:   Mobile [phone]       Will anyone else be joining the visit? NO  (If patient encounters technical issues they should call [phone])    Are changes needed to the allergy or medication list? Pt stated no changes to allergies and Pt stated no med changes    Are refills needed on medications prescribed by this physician? NO    Rooming Documentation:  Unable to complete questionnaire(s) due to time    Reason for visit: RECHECK    Dotty JAMES
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
## 2025-03-13 DIAGNOSIS — C71.9 GLIOBLASTOMA (H): Primary | ICD-10-CM

## 2025-03-13 RX ORDER — TEMOZOLOMIDE 140 MG/1
140 CAPSULE ORAL DAILY
Qty: 5 CAPSULE | Refills: 0 | Status: SHIPPED | OUTPATIENT
Start: 2025-03-17 | End: 2025-03-22

## 2025-03-13 RX ORDER — TEMOZOLOMIDE 180 MG/1
180 CAPSULE ORAL DAILY
Qty: 5 CAPSULE | Refills: 0 | Status: SHIPPED | OUTPATIENT
Start: 2025-03-17 | End: 2025-03-22

## 2025-03-13 NOTE — ORAL ONC MGMT
Oral Chemotherapy Monitoring Program     Placed call to patient in follow up of oral chemotherapy to inform that temozolomide prescription was sent to [facility] in advance of Jasson's appt with Dr Shaffer next week (and to let them know to look for a call from the pharmacy to set up delivery). Left message requesting call back. No drug names were mentioned. Will update when response received.     Also sent [employer] message with this information.    Arielle Sanches PharmD, BCOP  Hematology/Oncology Clinical Pharmacist  [facility]  [facility]
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
## 2025-03-17 ENCOUNTER — ONCOLOGY VISIT (OUTPATIENT)
Dept: ONCOLOGY | Facility: CLINIC | Age: 45
End: 2025-03-17
Attending: PSYCHIATRY & NEUROLOGY
Payer: COMMERCIAL

## 2025-03-17 ENCOUNTER — APPOINTMENT (OUTPATIENT)
Dept: LAB | Facility: CLINIC | Age: 45
End: 2025-03-17
Attending: PSYCHIATRY & NEUROLOGY
Payer: COMMERCIAL

## 2025-03-17 ENCOUNTER — TELEPHONE (OUTPATIENT)
Dept: ONCOLOGY | Facility: CLINIC | Age: 45
End: 2025-03-17

## 2025-03-17 ENCOUNTER — HOSPITAL ENCOUNTER (OUTPATIENT)
Dept: MRI IMAGING | Facility: CLINIC | Age: 45
Discharge: HOME | End: 2025-03-17
Attending: NURSE PRACTITIONER | Admitting: NURSE PRACTITIONER
Payer: COMMERCIAL

## 2025-03-17 ENCOUNTER — TUMOR CONFERENCE (OUTPATIENT)
Dept: ONCOLOGY | Facility: CLINIC | Age: 45
End: 2025-03-17
Payer: COMMERCIAL

## 2025-03-17 VITALS
RESPIRATION RATE: 18 BRPM | SYSTOLIC BLOOD PRESSURE: 115 MMHG | OXYGEN SATURATION: 98 % | HEART RATE: 75 BPM | DIASTOLIC BLOOD PRESSURE: 65 MMHG | TEMPERATURE: 97.6 F | WEIGHT: 185.3 LBS | BODY MASS INDEX: 25.84 KG/M2

## 2025-03-17 DIAGNOSIS — C71.9 GLIOBLASTOMA (H): ICD-10-CM

## 2025-03-17 DIAGNOSIS — Z79.899 ENCOUNTER FOR LONG-TERM (CURRENT) USE OF MEDICATIONS: ICD-10-CM

## 2025-03-17 DIAGNOSIS — C71.9 GLIOBLASTOMA (H): Primary | ICD-10-CM

## 2025-03-17 DIAGNOSIS — C71.9 HIGH GRADE GLIOMA NOT CLASSIFIABLE BY WHO CRITERIA (H): ICD-10-CM

## 2025-03-17 LAB
ALBUMIN SERPL BCG-MCNC: 4.3 G/DL (ref 3.5–5.2)
ALP SERPL-CCNC: 81 U/L (ref 40–150)
ALT SERPL W P-5'-P-CCNC: 21 U/L (ref 0–70)
ANION GAP SERPL CALCULATED.3IONS-SCNC: 9 MMOL/L (ref 7–15)
AST SERPL W P-5'-P-CCNC: 25 U/L (ref 0–45)
BASOPHILS # BLD AUTO: 0 10E3/UL (ref 0–0.2)
BASOPHILS NFR BLD AUTO: 1 %
BILIRUB SERPL-MCNC: 0.4 MG/DL
BUN SERPL-MCNC: 15.3 MG/DL (ref 6–20)
CALCIUM SERPL-MCNC: 9.7 MG/DL (ref 8.8–10.4)
CHLORIDE SERPL-SCNC: 106 MMOL/L (ref 98–107)
CREAT SERPL-MCNC: 0.79 MG/DL (ref 0.67–1.17)
EGFRCR SERPLBLD CKD-EPI 2021: >90 ML/MIN/1.73M2
EOSINOPHIL # BLD AUTO: 0.1 10E3/UL (ref 0–0.7)
EOSINOPHIL NFR BLD AUTO: 3 %
ERYTHROCYTE [DISTWIDTH] IN BLOOD BY AUTOMATED COUNT: 12.7 % (ref 10–15)
GLUCOSE SERPL-MCNC: 121 MG/DL (ref 70–99)
HCO3 SERPL-SCNC: 27 MMOL/L (ref 22–29)
HCT VFR BLD AUTO: 37.4 % (ref 40–53)
HGB BLD-MCNC: 12.6 G/DL (ref 13.3–17.7)
IMM GRANULOCYTES # BLD: 0 10E3/UL
IMM GRANULOCYTES NFR BLD: 1 %
LYMPHOCYTES # BLD AUTO: 0.7 10E3/UL (ref 0.8–5.3)
LYMPHOCYTES NFR BLD AUTO: 35 %
MCH RBC QN AUTO: 29.2 PG (ref 26.5–33)
MCHC RBC AUTO-ENTMCNC: 33.7 G/DL (ref 31.5–36.5)
MCV RBC AUTO: 87 FL (ref 78–100)
MONOCYTES # BLD AUTO: 0.3 10E3/UL (ref 0–1.3)
MONOCYTES NFR BLD AUTO: 15 %
NEUTROPHILS # BLD AUTO: 1 10E3/UL (ref 1.6–8.3)
NEUTROPHILS NFR BLD AUTO: 46 %
NRBC # BLD AUTO: 0 10E3/UL
NRBC BLD AUTO-RTO: 0 /100
PLATELET # BLD AUTO: 144 10E3/UL (ref 150–450)
POTASSIUM SERPL-SCNC: 4.1 MMOL/L (ref 3.4–5.3)
PROT SERPL-MCNC: 6.9 G/DL (ref 6.4–8.3)
RBC # BLD AUTO: 4.32 10E6/UL (ref 4.4–5.9)
SODIUM SERPL-SCNC: 142 MMOL/L (ref 135–145)
WBC # BLD AUTO: 2.1 10E3/UL (ref 4–11)

## 2025-03-17 PROCEDURE — 99213 OFFICE O/P EST LOW 20 MIN: CPT | Performed by: PSYCHIATRY & NEUROLOGY

## 2025-03-17 PROCEDURE — 70553 MRI BRAIN STEM W/O & W/DYE: CPT | Mod: 26 | Performed by: RADIOLOGY

## 2025-03-17 PROCEDURE — 84155 ASSAY OF PROTEIN SERUM: CPT | Performed by: PSYCHIATRY & NEUROLOGY

## 2025-03-17 PROCEDURE — 36415 COLL VENOUS BLD VENIPUNCTURE: CPT | Performed by: PSYCHIATRY & NEUROLOGY

## 2025-03-17 PROCEDURE — 70553 MRI BRAIN STEM W/O & W/DYE: CPT

## 2025-03-17 PROCEDURE — 85025 COMPLETE CBC W/AUTO DIFF WBC: CPT | Performed by: PSYCHIATRY & NEUROLOGY

## 2025-03-17 PROCEDURE — A9585 GADOBUTROL INJECTION: HCPCS | Performed by: NURSE PRACTITIONER

## 2025-03-17 PROCEDURE — 255N000002 HC RX 255 OP 636: Performed by: NURSE PRACTITIONER

## 2025-03-17 PROCEDURE — 84075 ASSAY ALKALINE PHOSPHATASE: CPT | Performed by: PSYCHIATRY & NEUROLOGY

## 2025-03-17 RX ORDER — GADOBUTROL 604.72 MG/ML
10 INJECTION INTRAVENOUS ONCE
Status: COMPLETED | OUTPATIENT
Start: 2025-03-17 | End: 2025-03-17

## 2025-03-17 RX ADMIN — GADOBUTROL 8.5 ML: 604.72 INJECTION INTRAVENOUS at 08:21

## 2025-03-17 ASSESSMENT — PAIN SCALES - GENERAL: PAINLEVEL_OUTOF10: NO PAIN (0)

## 2025-03-17 NOTE — TELEPHONE ENCOUNTER
Oral Chemotherapy Monitoring Program    Lab Monitoring Plan    Subjective/Objective:  Jasson Mohamud is a 44 year old male seen in clinic for an initial visit for oral chemotherapy education.          1/8/2025     9:00 AM 1/21/2025     9:00 AM 1/28/2025     1:00 PM 2/5/2025     8:00 AM 2/10/2025     2:00 PM 3/13/2025    11:00 AM 3/17/2025    11:00 AM   ORAL CHEMOTHERAPY   Assessment Type Refill;Other Lab Monitoring Monthly Follow up Other;Chart Review;Lab Monitoring Chart Review Initial Work up New Teach   Diagnosis Code Brain Cancer - Glioblastoma Brain Cancer - Glioblastoma Brain Cancer - Glioblastoma Brain Cancer - Glioblastoma Brain Cancer - Glioblastoma Brain Cancer - Glioblastoma Brain Cancer - Glioblastoma   Providers Dr. Edmund Shaffer   [facility]   Is this patient followed by the [facility] OC team?      No No   Drug Name Temodar (temozolomide) Temodar (temozolomide) Temodar (temozolomide) Temodar (temozolomide) Temodar (temozolomide) Temodar (temozolomide) Temodar (temozolomide)   Dose 160 mg 160 mg 160 mg 160 mg 160 mg 320 mg 320 mg   Current Schedule Daily Daily Daily Daily Daily Daily Daily   Cycle Details Continuous for 42 days during XRT Continuous for 42 days during XRT Continuous for 42 days during XRT Continuous for 42 days during XRT Continuous for 42 days during XRT 5 days on, 23 days off 5 days on, 23 days off   Start Date of Last Cycle 12/29/2024 12/29/2024 12/29/2024 12/29/2024     Planned next cycle start date     2/10/2025     Doses missed in last 2 weeks   0 1      Adherence Assessment   Adherent Non-adherent      Reason for Non-adherence    Patient forgets      Adherence Intervention Recommended    Alarm      Adverse Effects   No AE identified during assessment       Any new drug interactions?      No No   Is the dose as ordered appropriate for the patient?      Yes Yes       Last  "PHQ-2 Score on record:       2/4/2025     1:40 PM   PHQ-2 ( 1999 [employer])   Q1: Little interest or pleasure in doing things 0   Q2: Feeling down, depressed or hopeless 0   PHQ-2 Score 0       Vitals:  BP:   BP Readings from Last 1 Encounters:   03/17/25 115/65     Wt Readings from Last 1 Encounters:   03/17/25 84.1 kg (185 lb 4.8 oz)     Estimated body surface area is 2.05 meters squared as calculated from the following:    Height as of 2/10/25: 1.803 m (5' 11\").    Weight as of an earlier encounter on 3/17/25: 84.1 kg (185 lb 4.8 oz).    Labs:  _  Result Component Current Result Ref Range   Sodium 142 (3/17/2025) 135 - 145 mmol/L     _  Result Component Current Result Ref Range   Potassium 4.1 (3/17/2025) 3.4 - 5.3 mmol/L     _  Result Component Current Result Ref Range   Calcium 9.7 (3/17/2025) 8.8 - 10.4 mg/dL     No results found for Mag within last 30 days.     No results found for Phos within last 30 days.     _  Result Component Current Result Ref Range   Albumin 4.3 (3/17/2025) 3.5 - 5.2 g/dL     _  Result Component Current Result Ref Range   Urea Nitrogen 15.3 (3/17/2025) 6.0 - 20.0 mg/dL     _  Result Component Current Result Ref Range   Creatinine 0.79 (3/17/2025) 0.67 - 1.17 mg/dL     _  Result Component Current Result Ref Range   AST 25 (3/17/2025) 0 - 45 U/L     _  Result Component Current Result Ref Range   ALT 21 (3/17/2025) 0 - 70 U/L     _  Result Component Current Result Ref Range   Bilirubin Total 0.4 (3/17/2025) <=1.2 mg/dL     _  Result Component Current Result Ref Range   WBC Count 2.1 (L) (3/17/2025) 4.0 - 11.0 10e3/uL     _  Result Component Current Result Ref Range   Hemoglobin 12.6 (L) (3/17/2025) 13.3 - 17.7 g/dL     _  Result Component Current Result Ref Range   Platelet Count 144 (L) (3/17/2025) 150 - 450 10e3/uL     No results found for ANC within last 30 days.     _  Result Component Current Result Ref Range   Absolute Neutrophils 1.0 (L) (3/17/2025) 1.6 - 8.3 10e3/uL    "     Assessment:  Patient is appropriate to start therapy.    Plan:  Basic chemotherapy teaching was reviewed with the patient and the patient's caregiver including indication, start date of therapy, dose, administration, adverse effects, missed doses, food and drug interactions, monitoring, side effect management, office contact information, and safe handling. Written materials were provided and all questions answered.    Follow-Up:  Pharmacy to call in 1 week      Time Spent: 15 minutes    Dewayne Gresham PharmD, BCACP  Hematology/Oncology Clinical Pharmacist  [facility]  [phone]

## 2025-03-17 NOTE — NURSING NOTE
"Oncology Rooming Note    March 17, 2025 10:11 AM   Jasson Mohamud is a 44 year old male who presents for:    Chief Complaint   Patient presents with    Oncology Clinic Visit     Glioblastoma     Blood Draw     Labs drawn via  by RN. VS taken.     Initial Vitals: /65 (BP Location: Right arm, Patient Position: Sitting, Cuff Size: Adult Large)   Pulse 75   Temp 97.6  F (36.4  C) (Oral)   Resp 18   Wt 84.1 kg (185 lb 4.8 oz)   SpO2 98%   BMI 25.84 kg/m   Estimated body mass index is 25.84 kg/m  as calculated from the following:    Height as of 2/10/25: 1.803 m (5' 11\").    Weight as of this encounter: 84.1 kg (185 lb 4.8 oz). Body surface area is 2.05 meters squared.  No Pain (0) Comment: Data Unavailable   No LMP for male patient.  Allergies reviewed: Yes  Medications reviewed: Yes    Medications: Medication refills not needed today.  Pharmacy name entered into "[employer] [place]":    [facility] [address], [place] - [phone] [facility] - [place], [place] - [address]    Frailty Screening:   Is the patient here for a new oncology consult visit in cancer care? 2. No    PHQ9:  Did this patient require a PHQ9?: No      Clinical concerns:        Dee Mckeon              "
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
## 2025-03-17 NOTE — NURSING NOTE
Chief Complaint   Patient presents with    Oncology Clinic Visit     Glioblastoma     Blood Draw     Labs drawn via  by RN. VS taken.     Labs collected from venipuncture by RN. Vitals taken. Checked in for appointment(s).     Minnie Machado RN

## 2025-03-24 ENCOUNTER — TELEPHONE (OUTPATIENT)
Dept: ONCOLOGY | Facility: CLINIC | Age: 45
End: 2025-03-24
Payer: COMMERCIAL

## 2025-03-24 NOTE — ORAL ONC MGMT
Oral Chemotherapy Monitoring Program     Placed call to Jasson Mohamud in follow up of Temodar oral chemotherapy.     Voicemail box was full so unable to leave a message. Will update when response is received.    Caprice Zhou, PharmD  Oral Chemotherapy Monitoring Program  [facility]  [phone]  March 24, 2025

## 2025-03-26 NOTE — ORAL ONC MGMT
Oral Chemotherapy Monitoring Program    Subjective/Objective:  Jasson Mohamud is a 44 year old male contacted by phone for a follow-up visit for oral chemotherapy.  Jasson confirms taking the appropriate dose of temozolomide 320mg (1h953by capsulle + [id number] capsule) nightly for 5 nights, then 23 days off. He did not start C1D1 until 3/23/25.  Denies medication changes, missed doses, and recent hospital or ED visits. Jasson shares that after his TMZ+ XRT was completed, his nerve pain felt the best ever.  Now that he has started adjuvant therapy, his trenton pain has flared up again and is making it hard for him to sleep at night.  He was wondering if Dr. Shaffer would prescribe something for sleep or if he needed to go back to his PCP.  I referred him to his PCP for this.  He has also noticed a grade 1 fatigue that is relieved with rest.  He denies nausea and constipation.            1/28/2025     1:00 PM 2/5/2025     8:00 AM 2/10/2025     2:00 PM 3/13/2025    11:00 AM 3/17/2025    11:00 AM 3/24/2025    12:00 PM 3/26/2025     1:00 PM   ORAL CHEMOTHERAPY   Assessment Type Monthly Follow up Other;Chart Review;Lab Monitoring Chart Review Initial Work up New Teach Other Initial Follow up   Diagnosis Code Brain Cancer - Glioblastoma Brain Cancer - Glioblastoma Brain Cancer - Glioblastoma Brain Cancer - Glioblastoma Brain Cancer - Glioblastoma Brain Cancer - Glioblastoma Brain Cancer - Glioblastoma   Providers Dr. Edmund Shaffer   [facility]   Is this patient followed by the [facility] OC team?    No No No No   Drug Name Temodar (temozolomide) Temodar (temozolomide) Temodar (temozolomide) Temodar (temozolomide) Temodar (temozolomide) Temodar (temozolomide) Temodar (temozolomide)   Dose 160 mg 160 mg 160 mg 320 mg 320 mg 320 mg 320 mg   Current Schedule Daily Daily Daily Daily Daily Daily Daily   Cycle Details Continuous for  "42 days during XRT Continuous for 42 days during XRT Continuous for 42 days during XRT 5 days on, 23 days off 5 days on, 23 days off 5 days on, 23 days off 5 days on, 23 days off   Start Date of Last Cycle 12/29/2024  12/29/2024    3/23/2025   Planned next cycle start date   2/10/2025    4/20/2025   Doses missed in last 2 weeks 0 1     0   Adherence Assessment Adherent Non-adherent     Adherent   Reason for Non-adherence  Patient forgets        Adherence Intervention Recommended  Alarm        Adverse Effects No AE identified during assessment      Fatigue;Other (See Note for Details)   Fatigue       Grade 1   Pharmacist Intervention(fatigue)       No   Other (See Note for Details)       Nerve pain increase   Pharmacist intervention(other)       No   Any new drug interactions?    No No  No   Is the dose as ordered appropriate for the patient?    Yes Yes  Yes   Since the last time we talked, have you been hospitalized or used the emergency room?       No       Last PHQ-2 Score on record:       2/4/2025     1:40 PM   PHQ-2 ( 1999 [employer])   Q1: Little interest or pleasure in doing things 0   Q2: Feeling down, depressed or hopeless 0   PHQ-2 Score 0       Vitals:  BP:   BP Readings from Last 1 Encounters:   03/17/25 115/65     Wt Readings from Last 1 Encounters:   03/17/25 84.1 kg (185 lb 4.8 oz)     Estimated body surface area is 2.05 meters squared as calculated from the following:    Height as of 2/10/25: 1.803 m (5' 11\").    Weight as of 3/17/25: 84.1 kg (185 lb 4.8 oz).    Labs:  _  Result Component Current Result Ref Range   Sodium 142 (3/17/2025) 135 - 145 mmol/L     _  Result Component Current Result Ref Range   Potassium 4.1 (3/17/2025) 3.4 - 5.3 mmol/L     _  Result Component Current Result Ref Range   Calcium 9.7 (3/17/2025) 8.8 - 10.4 mg/dL     No results found for Mag within last 30 days.     No results found for Phos within last 30 days.     _  Result Component Current Result Ref Range   Albumin 4.3 " (3/17/2025) 3.5 - 5.2 g/dL     _  Result Component Current Result Ref Range   Urea Nitrogen 15.3 (3/17/2025) 6.0 - 20.0 mg/dL     _  Result Component Current Result Ref Range   Creatinine 0.79 (3/17/2025) 0.67 - 1.17 mg/dL     _  Result Component Current Result Ref Range   AST 25 (3/17/2025) 0 - 45 U/L     _  Result Component Current Result Ref Range   ALT 21 (3/17/2025) 0 - 70 U/L     _  Result Component Current Result Ref Range   Bilirubin Total 0.4 (3/17/2025) <=1.2 mg/dL     _  Result Component Current Result Ref Range   WBC Count 2.1 (L) (3/17/2025) 4.0 - 11.0 10e3/uL     _  Result Component Current Result Ref Range   Hemoglobin 12.6 (L) (3/17/2025) 13.3 - 17.7 g/dL     _  Result Component Current Result Ref Range   Platelet Count 144 (L) (3/17/2025) 150 - 450 10e3/uL     No results found for ANC within last 30 days.     _  Result Component Current Result Ref Range   Absolute Neutrophils 1.0 (L) (3/17/2025) 1.6 - 8.3 10e3/uL          Assessment/Plan:  Jasson is tolerating adjuvant temozolomide therapy fairly well with some flaring of his nerve pain and grade 1 fatigue.  He feels like these side effects are manageable at this time.  Will continue therapy as planned.     Follow-Up:  4/7: due for every 2 week labs  4/17: Labs  4/18: Porsha visit    Refill Due:  4/11/25    Caprice Zhou PharmD  Hematology/Oncology Clinical Pharmacist  [facility]  [phone]    **The oral chemotherapy pharmacists are now working on provider-specific teams. Your pharmacist team can be reached at [facility] ORAL CHEMO [facility]-ONC2 or [phone].**

## 2025-04-07 ENCOUNTER — APPOINTMENT (OUTPATIENT)
Dept: LAB | Facility: CLINIC | Age: 45
End: 2025-04-07
Payer: COMMERCIAL

## 2025-04-08 ENCOUNTER — DOCUMENTATION ONLY (OUTPATIENT)
Dept: ONCOLOGY | Facility: CLINIC | Age: 45
End: 2025-04-08
Payer: COMMERCIAL

## 2025-04-08 NOTE — PROGRESS NOTES
Oral Chemotherapy Monitoring Program  Lab Follow Up    Reviewed lab results from 4/7/25.        2/5/2025     8:00 AM 2/10/2025     2:00 PM 3/13/2025    11:00 AM 3/17/2025    11:00 AM 3/24/2025    12:00 PM 3/26/2025     1:00 PM 4/8/2025    12:00 PM   ORAL CHEMOTHERAPY   Assessment Type Other;Chart Review;Lab Monitoring Chart Review Initial Work up New Teach Other Initial Follow up Lab Monitoring   Diagnosis Code Brain Cancer - Glioblastoma Brain Cancer - Glioblastoma Brain Cancer - Glioblastoma Brain Cancer - Glioblastoma Brain Cancer - Glioblastoma Brain Cancer - Glioblastoma Brain Cancer - Glioblastoma   Providers Dr. Edmund Shaffer   [facility]   Is this patient followed by the [facility] OC team?   No No No No No   Drug Name Temodar (temozolomide) Temodar (temozolomide) Temodar (temozolomide) Temodar (temozolomide) Temodar (temozolomide) Temodar (temozolomide) Temodar (temozolomide)   Dose 160 mg 160 mg 320 mg 320 mg 320 mg 320 mg 320 mg   Current Schedule Daily Daily Daily Daily Daily Daily Daily   Cycle Details Continuous for 42 days during XRT Continuous for 42 days during XRT 5 days on, 23 days off 5 days on, 23 days off 5 days on, 23 days off 5 days on, 23 days off 5 days on, 23 days off   Start Date of Last Cycle  12/29/2024    3/23/2025    Planned next cycle start date  2/10/2025    4/20/2025    Doses missed in last 2 weeks 1     0    Adherence Assessment Non-adherent     Adherent    Reason for Non-adherence Patient forgets         Adherence Intervention Recommended Alarm         Adverse Effects      Fatigue;Other (See Note for Details)    Fatigue      Grade 1    Pharmacist Intervention(fatigue)      No    Other (See Note for Details)      Nerve pain increase    Pharmacist intervention(other)      No    Any new drug interactions?   No No  No    Is the dose as ordered appropriate for the patient?   Yes  Yes  Yes    Since the last time we talked, have you been hospitalized or used the emergency room?      No        Labs:  _  Result Component Current Result Ref Range   Sodium 139 (4/7/2025) 135 - 145 mmol/L     _  Result Component Current Result Ref Range   Potassium 3.9 (4/7/2025) 3.4 - 5.3 mmol/L     _  Result Component Current Result Ref Range   Calcium 9.4 (4/7/2025) 8.8 - 10.4 mg/dL     No results found for Mag within last 30 days.     No results found for Phos within last 30 days.     _  Result Component Current Result Ref Range   Albumin 4.3 (4/7/2025) 3.5 - 5.2 g/dL     _  Result Component Current Result Ref Range   Urea Nitrogen 15.9 (4/7/2025) 6.0 - 20.0 mg/dL     _  Result Component Current Result Ref Range   Creatinine 0.83 (4/7/2025) 0.67 - 1.17 mg/dL     _  Result Component Current Result Ref Range   AST 22 (4/7/2025) 0 - 45 U/L     _  Result Component Current Result Ref Range   ALT 23 (4/7/2025) 0 - 70 U/L     _  Result Component Current Result Ref Range   Bilirubin Total 0.4 (4/7/2025) <=1.2 mg/dL     _  Result Component Current Result Ref Range   WBC Count 3.7 (L) (4/7/2025) 4.0 - 11.0 10e3/uL     _  Result Component Current Result Ref Range   Hemoglobin 13.0 (L) (4/7/2025) 13.3 - 17.7 g/dL     _  Result Component Current Result Ref Range   Platelet Count 158 (4/7/2025) 150 - 450 10e3/uL     No results found for ANC within last 30 days.     _  Result Component Current Result Ref Range   Absolute Neutrophils 2.3 (4/7/2025) 1.6 - 8.3 10e3/uL        Assessment & Plan:  No concerning lab abnormalities.    No changes with Temodar needed at this time.    Spotigo message sent to patient.      Nora Paz, PharmD, [facility]  Oncology Clinical Pharmacy Specialist  [facility]  [phone]

## 2025-04-09 ENCOUNTER — TELEPHONE (OUTPATIENT)
Dept: ONCOLOGY | Facility: CLINIC | Age: 45
End: 2025-04-09
Payer: COMMERCIAL

## 2025-04-09 NOTE — TELEPHONE ENCOUNTER
After discussion with Porsha re: pt's specialty pharmacy and releasing C2 temozolomide early so no delay in next cycle, Porsha asked if I could call Jasson to see if he's ok increasing his TMZ to 200mg/m2. Pt endorses grade 2 fatigue and feels miserable. Pt states he's in week 3 of his cycle and is having trouble bouncing back and does not wish to increase his TMZ for cycle 2. Pt also jill some soreness in his lower back which is new.    I sent a message to Porsha communicating this, awaiting a response to see if she'll sign the 150mg/m2 order or wait until she sees Jasson on 4/18.    I told Jasson when I hear back from Porsha I'll send a MyChart to update him.    Roselyn Damon PharmD  Hematology/Oncology Clinical Pharmacist  [facility]  [phone]    **The oral chemotherapy pharmacists are now working on provider-specific teams. Your pharmacist team can be reached at [facility]AL CHEMO [facility]-ONC2 or [phone].**

## 2025-04-10 ENCOUNTER — MYC MEDICAL ADVICE (OUTPATIENT)
Dept: ONCOLOGY | Facility: CLINIC | Age: 45
End: 2025-04-10
Payer: COMMERCIAL

## 2025-04-10 DIAGNOSIS — C71.9 GLIOBLASTOMA (H): Primary | ICD-10-CM

## 2025-04-10 RX ORDER — TEMOZOLOMIDE 180 MG/1
180 CAPSULE ORAL DAILY
Qty: 5 CAPSULE | Refills: 0 | Status: SHIPPED | OUTPATIENT
Start: 2025-04-14 | End: 2025-04-19

## 2025-04-10 RX ORDER — TEMOZOLOMIDE 140 MG/1
140 CAPSULE ORAL DAILY
Qty: 5 CAPSULE | Refills: 0 | Status: SHIPPED | OUTPATIENT
Start: 2025-04-14 | End: 2025-04-19

## 2025-04-18 ENCOUNTER — VIRTUAL VISIT (OUTPATIENT)
Dept: ONCOLOGY | Facility: CLINIC | Age: 45
End: 2025-04-18
Attending: NURSE PRACTITIONER
Payer: COMMERCIAL

## 2025-04-18 VITALS — BODY MASS INDEX: 25.9 KG/M2 | HEIGHT: 71 IN | WEIGHT: 185 LBS

## 2025-04-18 DIAGNOSIS — C71.9 GLIOBLASTOMA (H): Primary | ICD-10-CM

## 2025-04-18 DIAGNOSIS — C71.9 HIGH GRADE GLIOMA NOT CLASSIFIABLE BY WHO CRITERIA (H): ICD-10-CM

## 2025-04-18 ASSESSMENT — PAIN SCALES - GENERAL: PAINLEVEL_OUTOF10: MILD PAIN (2)

## 2025-04-18 NOTE — PROGRESS NOTES
"Virtual Visit Details    Type of service:  Video Visit     Originating Location (pt. Location): {video visit patient location:550447::\"Home\"}  {PROVIDER LOCATION On-site should be selected for visits conducted from your clinic location or adjoining [facility] hospital, academic office, or other nearby [facility] building. Off-site should be selected for all other provider locations, including home:279646}  Distant Location (provider location):  {virtual location provider:869174}  Platform used for Video Visit: {Virtual Visit Platforms:232765::\""[employer]\"}  "
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
## 2025-04-18 NOTE — NURSING NOTE
Current patient location:  [place] the [facility] in [place], [place]     Is the patient currently in the state of [place]? YES    Visit mode: VIDEO    If the visit is dropped, the patient can be reconnected by:VIDEO VISIT: Text to cell phone:   Telephone Information:   Mobile [phone]       Will anyone else be joining the visit? NO  (If patient encounters technical issues they should call [phone])    Are changes needed to the allergy or medication list? Pt completed echeck-in an confirms medications and allergies are correct.      Are refills needed on medications prescribed by this physician? YES    Rooming Documentation:  Questionnaire(s) completed    Reason for visit: KEISHA JAMES

## 2025-04-28 RX ORDER — ONDANSETRON 4 MG/1
4 TABLET, FILM COATED ORAL AT BEDTIME
Qty: 30 TABLET | Refills: 3 | Status: CANCELLED | OUTPATIENT
Start: 2025-04-28

## 2025-05-05 ENCOUNTER — DOCUMENTATION ONLY (OUTPATIENT)
Dept: ONCOLOGY | Facility: CLINIC | Age: 45
End: 2025-05-05
Payer: COMMERCIAL

## 2025-06-13 ENCOUNTER — APPOINTMENT (OUTPATIENT)
Dept: LAB | Facility: CLINIC | Age: 45
End: 2025-06-13
Attending: PSYCHIATRY & NEUROLOGY
Payer: COMMERCIAL

## 2025-06-13 ENCOUNTER — HOSPITAL ENCOUNTER (OUTPATIENT)
Dept: MRI IMAGING | Facility: CLINIC | Age: 45
Discharge: HOME | End: 2025-06-13
Attending: PSYCHIATRY & NEUROLOGY
Payer: COMMERCIAL

## 2025-06-13 DIAGNOSIS — C71.9 GLIOBLASTOMA (H): ICD-10-CM

## 2025-06-13 PROCEDURE — A9585 GADOBUTROL INJECTION: HCPCS | Performed by: PSYCHIATRY & NEUROLOGY

## 2025-06-13 PROCEDURE — 70553 MRI BRAIN STEM W/O & W/DYE: CPT | Mod: 26 | Performed by: STUDENT IN AN ORGANIZED HEALTH CARE EDUCATION/TRAINING PROGRAM

## 2025-06-13 PROCEDURE — 70553 MRI BRAIN STEM W/O & W/DYE: CPT

## 2025-06-13 PROCEDURE — 255N000002 HC RX 255 OP 636: Performed by: PSYCHIATRY & NEUROLOGY

## 2025-06-13 RX ORDER — GADOBUTROL 604.72 MG/ML
10 INJECTION INTRAVENOUS ONCE
Status: COMPLETED | OUTPATIENT
Start: 2025-06-13 | End: 2025-06-13

## 2025-06-13 RX ADMIN — GADOBUTROL 8 ML: 604.72 INJECTION INTRAVENOUS at 07:55

## (undated) RX ORDER — PENTAMIDINE ISETHIONATE 300 MG/300MG
INHALANT RESPIRATORY (INHALATION)
Status: DISPENSED
Start: 2024-12-30

## (undated) RX ORDER — ALBUTEROL SULFATE 0.83 MG/ML
SOLUTION RESPIRATORY (INHALATION)
Status: DISPENSED
Start: 2024-12-30